# Patient Record
Sex: MALE | Race: BLACK OR AFRICAN AMERICAN | NOT HISPANIC OR LATINO | Employment: UNEMPLOYED | URBAN - METROPOLITAN AREA
[De-identification: names, ages, dates, MRNs, and addresses within clinical notes are randomized per-mention and may not be internally consistent; named-entity substitution may affect disease eponyms.]

---

## 2018-10-17 ENCOUNTER — OFFICE VISIT (OUTPATIENT)
Dept: FAMILY MEDICINE | Facility: CLINIC | Age: 58
End: 2018-10-17
Payer: MEDICARE

## 2018-10-17 VITALS
BODY MASS INDEX: 26.34 KG/M2 | OXYGEN SATURATION: 97 % | SYSTOLIC BLOOD PRESSURE: 132 MMHG | DIASTOLIC BLOOD PRESSURE: 84 MMHG | HEIGHT: 68 IN | TEMPERATURE: 98 F | HEART RATE: 74 BPM | WEIGHT: 173.81 LBS

## 2018-10-17 DIAGNOSIS — Z11.3 SCREENING EXAMINATION FOR STD (SEXUALLY TRANSMITTED DISEASE): ICD-10-CM

## 2018-10-17 DIAGNOSIS — N52.9 ERECTILE DYSFUNCTION, UNSPECIFIED ERECTILE DYSFUNCTION TYPE: ICD-10-CM

## 2018-10-17 DIAGNOSIS — Z12.11 ENCOUNTER FOR SCREENING FOR MALIGNANT NEOPLASM OF COLON: ICD-10-CM

## 2018-10-17 DIAGNOSIS — R79.9 ABNORMAL FINDING OF BLOOD CHEMISTRY: ICD-10-CM

## 2018-10-17 DIAGNOSIS — Z11.4 ENCOUNTER FOR SCREENING FOR HIV: ICD-10-CM

## 2018-10-17 DIAGNOSIS — R68.82 LOW LIBIDO: ICD-10-CM

## 2018-10-17 DIAGNOSIS — Z12.5 ENCOUNTER FOR SCREENING FOR MALIGNANT NEOPLASM OF PROSTATE: ICD-10-CM

## 2018-10-17 DIAGNOSIS — Z00.00 ROUTINE PHYSICAL EXAMINATION: Primary | ICD-10-CM

## 2018-10-17 DIAGNOSIS — R73.03 PREDIABETES: ICD-10-CM

## 2018-10-17 PROCEDURE — 99203 OFFICE O/P NEW LOW 30 MIN: CPT | Mod: PBBFAC,PO | Performed by: FAMILY MEDICINE

## 2018-10-17 PROCEDURE — 99999 PR PBB SHADOW E&M-NEW PATIENT-LVL III: CPT | Mod: PBBFAC,,, | Performed by: FAMILY MEDICINE

## 2018-10-17 PROCEDURE — 99214 OFFICE O/P EST MOD 30 MIN: CPT | Mod: S$PBB,,, | Performed by: FAMILY MEDICINE

## 2018-10-17 RX ORDER — HALOPERIDOL 5 MG/1
TABLET ORAL
COMMUNITY
Start: 2018-10-02 | End: 2018-10-17

## 2018-10-17 RX ORDER — SILDENAFIL CITRATE 20 MG/1
20 TABLET ORAL DAILY PRN
Qty: 30 TABLET | Refills: 3 | Status: ON HOLD | OUTPATIENT
Start: 2018-10-17 | End: 2020-10-04 | Stop reason: HOSPADM

## 2018-10-17 RX ORDER — PENICILLIN G BENZATHINE 2400000 [IU]/4ML
INJECTION, SUSPENSION INTRAMUSCULAR
COMMUNITY
Start: 2018-08-07 | End: 2018-10-17

## 2018-10-17 RX ORDER — VALACYCLOVIR HYDROCHLORIDE 500 MG/1
500 TABLET, FILM COATED ORAL 2 TIMES DAILY
Refills: 6 | COMMUNITY
Start: 2018-07-24 | End: 2018-10-17

## 2018-10-17 RX ORDER — HALOPERIDOL 10 MG/1
TABLET ORAL
COMMUNITY
Start: 2018-09-06 | End: 2018-10-17

## 2018-10-17 NOTE — PROGRESS NOTES
[facility]  Progress Note    SUBJECTIVE:     Chief Complaint   Patient presents with    Establish [facility]    Annual Exam       HPI   Sidney Taylor  is a 58 y.o. male here for routine physical exam and to establish care. He says having low libido and would like to have his testosterone checked. Doing well otherwise and has no new complaints/problems at this time.     Review of patient's allergies indicates:   Allergen Reactions    Bactrim [sulfamethoxazole-trimethoprim] Other (See Comments)     Patient states he gets really pale       History reviewed. No pertinent past medical history.  Past Surgical History:   Procedure Laterality Date    HAND SURGERY  2016     Family History   Problem Relation Age of Onset    Diabetes Mother     Hypertension Mother      Social History     Tobacco Use    Smoking status: Never Smoker    Smokeless tobacco: Never Used   Substance Use Topics    Alcohol use: No     Frequency: Never    Drug use: No        Review of Systems   Constitutional: Negative for chills, diaphoresis and fever.   HENT: Negative for congestion, ear pain and sore throat.    Eyes: Negative for photophobia and discharge.   Respiratory: Negative for cough, shortness of breath and wheezing.    Cardiovascular: Negative for chest pain and palpitations.   Gastrointestinal: Negative for abdominal pain, constipation, diarrhea, nausea and vomiting.   Genitourinary: Negative for dysuria and hematuria.   Musculoskeletal: Negative for back pain and myalgias.   Skin: Negative for itching and rash.   Neurological: Negative for dizziness, sensory change, focal weakness, weakness and headaches.   All other systems reviewed and are negative.    OBJECTIVE:     Vitals:    10/17/18 1020   BP: 132/84   Pulse: 74   Temp: 98.1 °F (36.7 °C)     Body mass index is 26.43 kg/m².    Physical Exam   Constitutional: He is oriented to person, place, and time and well-developed, well-nourished, and in no distress. No distress.    HENT:   Head: Normocephalic and atraumatic.   Right Ear: Tympanic membrane is not perforated, not erythematous and not bulging. No hemotympanum.   Left Ear: Tympanic membrane is not perforated, not erythematous and not bulging. No hemotympanum.   Mouth/Throat: Oropharynx is clear and moist. No oropharyngeal exudate.   Eyes: Conjunctivae and EOM are normal. Pupils are equal, round, and reactive to light.   Neck: No thyromegaly present.   Cardiovascular: Normal rate, regular rhythm and normal heart sounds. Exam reveals no gallop and no friction rub.   No murmur heard.  Pulmonary/Chest: Effort normal and breath sounds normal. No respiratory distress. He has no wheezes. He has no rales.   Abdominal: Soft. Bowel sounds are normal. He exhibits no distension. There is no tenderness. There is no rebound and no guarding.   Musculoskeletal: Normal range of motion. He exhibits no edema or tenderness.   Lymphadenopathy:     He has no cervical adenopathy.   Neurological: He is alert and oriented to person, place, and time.   Skin: Skin is warm. No rash noted. He is not diaphoretic. No erythema.       Old records were reviewed. Labs and/or images were independently reviewed.    ASSESSMENT     1. Routine physical examination    2. Encounter for screening for malignant neoplasm of colon    3. Low libido    4. Erectile dysfunction, unspecified erectile dysfunction type    5. Screening examination for STD (sexually transmitted disease)    6. Abnormal finding of blood chemistry     7. Prediabetes     8. Encounter for screening for malignant neoplasm of prostate     9. Encounter for screening for HIV         PLAN:     Routine physical examination  -     CBC auto differential; Future  -     Comprehensive metabolic panel; Future  -     Hemoglobin A1c; Future  -     Lipid panel; Future  -     T4, free; Future  -     TSH; Future  -     PSA, Screening; Future; Expected date: 10/17/2018  -     Hepatitis panel, acute; Future; Expected date:  10/17/2018  -     RPR; Future; Expected date: 10/17/2018  -     HIV-1 and HIV-2 antibodies; Future; Expected date: 10/17/2018  -     We briefly discussed diet, exercise, and routine preventive exams. All questions and comments addressed.    Encounter for screening for malignant neoplasm of colon  -     Case request GI: COLONOSCOPY    Low libido  -     Testosterone; Future; Expected date: 10/17/2018    Erectile dysfunction, unspecified erectile dysfunction type  -     sildenafil (REVATIO) 20 mg Tab; Take 1 tablet (20 mg total) by mouth daily as needed.  Dispense: 30 tablet; Refill: 3  -     Gave coupon to patient.     Screening examination for STD (sexually transmitted disease)  -     Hepatitis panel, acute; Future; Expected date: 10/17/2018  -     RPR; Future; Expected date: 10/17/2018  -     HIV-1 and HIV-2 antibodies; Future; Expected date: 10/17/2018    Prediabetes   -     T4, free; Future  -     TSH; Future    Encounter for screening for malignant neoplasm of prostate   -     PSA, Screening; Future; Expected date: 10/17/2018    Encounter for screening for HIV   -     HIV-1 and HIV-2 antibodies; Future; Expected date: 10/17/2018      RTC LUPILLO Santa MD  10/17/2018 4:34 PM

## 2018-10-18 ENCOUNTER — LAB VISIT (OUTPATIENT)
Dept: LAB | Facility: HOSPITAL | Age: 58
End: 2018-10-18
Attending: FAMILY MEDICINE
Payer: MEDICARE

## 2018-10-18 DIAGNOSIS — Z12.5 ENCOUNTER FOR SCREENING FOR MALIGNANT NEOPLASM OF PROSTATE: ICD-10-CM

## 2018-10-18 DIAGNOSIS — Z11.4 ENCOUNTER FOR SCREENING FOR HIV: ICD-10-CM

## 2018-10-18 DIAGNOSIS — R68.82 LOW LIBIDO: ICD-10-CM

## 2018-10-18 DIAGNOSIS — R73.03 PREDIABETES: ICD-10-CM

## 2018-10-18 DIAGNOSIS — R79.9 ABNORMAL FINDING OF BLOOD CHEMISTRY: ICD-10-CM

## 2018-10-18 DIAGNOSIS — Z00.00 ROUTINE PHYSICAL EXAMINATION: ICD-10-CM

## 2018-10-18 DIAGNOSIS — Z11.3 SCREENING EXAMINATION FOR STD (SEXUALLY TRANSMITTED DISEASE): ICD-10-CM

## 2018-10-18 LAB
ALBUMIN SERPL BCP-MCNC: 4 G/DL
ALP SERPL-CCNC: 75 U/L
ALT SERPL W/O P-5'-P-CCNC: 21 U/L
ANION GAP SERPL CALC-SCNC: 5 MMOL/L
AST SERPL-CCNC: 29 U/L
BASOPHILS # BLD AUTO: 0.03 K/UL
BASOPHILS NFR BLD: 0.7 %
BILIRUB SERPL-MCNC: 0.5 MG/DL
BUN SERPL-MCNC: 12 MG/DL
CALCIUM SERPL-MCNC: 9.7 MG/DL
CHLORIDE SERPL-SCNC: 102 MMOL/L
CHOLEST SERPL-MCNC: 155 MG/DL
CHOLEST/HDLC SERPL: 4.1 {RATIO}
CO2 SERPL-SCNC: 30 MMOL/L
COMPLEXED PSA SERPL-MCNC: 2.2 NG/ML
CREAT SERPL-MCNC: 1.2 MG/DL
DIFFERENTIAL METHOD: ABNORMAL
EOSINOPHIL # BLD AUTO: 0 K/UL
EOSINOPHIL NFR BLD: 0.7 %
ERYTHROCYTE [DISTWIDTH] IN BLOOD BY AUTOMATED COUNT: 12.5 %
EST. GFR  (AFRICAN AMERICAN): >60 ML/MIN/1.73 M^2
EST. GFR  (NON AFRICAN AMERICAN): >60 ML/MIN/1.73 M^2
ESTIMATED AVG GLUCOSE: 177 MG/DL
GLUCOSE SERPL-MCNC: 182 MG/DL
HBA1C MFR BLD HPLC: 7.8 %
HCT VFR BLD AUTO: 45.9 %
HDLC SERPL-MCNC: 38 MG/DL
HDLC SERPL: 24.5 %
HGB BLD-MCNC: 14.5 G/DL
IMM GRANULOCYTES # BLD AUTO: 0.01 K/UL
IMM GRANULOCYTES NFR BLD AUTO: 0.2 %
LDLC SERPL CALC-MCNC: 86.6 MG/DL
LYMPHOCYTES # BLD AUTO: 1.5 K/UL
LYMPHOCYTES NFR BLD: 36.1 %
MCH RBC QN AUTO: 29.7 PG
MCHC RBC AUTO-ENTMCNC: 31.6 G/DL
MCV RBC AUTO: 94 FL
MONOCYTES # BLD AUTO: 0.5 K/UL
MONOCYTES NFR BLD: 11.8 %
NEUTROPHILS # BLD AUTO: 2.1 K/UL
NEUTROPHILS NFR BLD: 50.5 %
NONHDLC SERPL-MCNC: 117 MG/DL
NRBC BLD-RTO: 0 /100 WBC
PLATELET # BLD AUTO: 179 K/UL
PMV BLD AUTO: 12.8 FL
POTASSIUM SERPL-SCNC: 3.9 MMOL/L
PROT SERPL-MCNC: 8 G/DL
RBC # BLD AUTO: 4.88 M/UL
SODIUM SERPL-SCNC: 137 MMOL/L
T4 FREE SERPL-MCNC: 0.91 NG/DL
TESTOST SERPL-MCNC: 452 NG/DL
TRIGL SERPL-MCNC: 152 MG/DL
TSH SERPL DL<=0.005 MIU/L-ACNC: 0.6 UIU/ML
WBC # BLD AUTO: 4.24 K/UL

## 2018-10-18 PROCEDURE — 83036 HEMOGLOBIN GLYCOSYLATED A1C: CPT

## 2018-10-18 PROCEDURE — 86592 SYPHILIS TEST NON-TREP QUAL: CPT

## 2018-10-18 PROCEDURE — 36415 COLL VENOUS BLD VENIPUNCTURE: CPT | Mod: PO

## 2018-10-18 PROCEDURE — 80061 LIPID PANEL: CPT

## 2018-10-18 PROCEDURE — 84153 ASSAY OF PSA TOTAL: CPT

## 2018-10-18 PROCEDURE — 80074 ACUTE HEPATITIS PANEL: CPT

## 2018-10-18 PROCEDURE — 84443 ASSAY THYROID STIM HORMONE: CPT

## 2018-10-18 PROCEDURE — 85025 COMPLETE CBC W/AUTO DIFF WBC: CPT

## 2018-10-18 PROCEDURE — 84439 ASSAY OF FREE THYROXINE: CPT

## 2018-10-18 PROCEDURE — 80053 COMPREHEN METABOLIC PANEL: CPT

## 2018-10-18 PROCEDURE — 84403 ASSAY OF TOTAL TESTOSTERONE: CPT

## 2018-10-18 PROCEDURE — 86703 HIV-1/HIV-2 1 RESULT ANTBDY: CPT

## 2018-10-19 DIAGNOSIS — Z11.59 NEED FOR HEPATITIS C SCREENING TEST: ICD-10-CM

## 2018-10-19 DIAGNOSIS — E11.9 TYPE 2 DIABETES MELLITUS WITHOUT COMPLICATION: ICD-10-CM

## 2018-10-19 LAB
HAV IGM SERPL QL IA: NEGATIVE
HBV CORE IGM SERPL QL IA: NEGATIVE
HBV SURFACE AG SERPL QL IA: NEGATIVE
HCV AB SERPL QL IA: NEGATIVE
HIV 1+2 AB+HIV1 P24 AG SERPL QL IA: NEGATIVE
RPR SER QL: NORMAL

## 2018-11-09 DIAGNOSIS — E11.9 TYPE 2 DIABETES MELLITUS WITHOUT COMPLICATION, UNSPECIFIED WHETHER LONG TERM INSULIN USE: ICD-10-CM

## 2018-11-27 ENCOUNTER — OFFICE VISIT (OUTPATIENT)
Dept: FAMILY MEDICINE | Facility: CLINIC | Age: 58
End: 2018-11-27
Payer: MEDICARE

## 2018-11-27 ENCOUNTER — LAB VISIT (OUTPATIENT)
Dept: LAB | Facility: HOSPITAL | Age: 58
End: 2018-11-27
Attending: FAMILY MEDICINE
Payer: MEDICARE

## 2018-11-27 ENCOUNTER — CLINICAL SUPPORT (OUTPATIENT)
Dept: OPHTHALMOLOGY | Facility: CLINIC | Age: 58
End: 2018-11-27
Attending: FAMILY MEDICINE
Payer: MEDICARE

## 2018-11-27 VITALS
SYSTOLIC BLOOD PRESSURE: 138 MMHG | HEIGHT: 68 IN | DIASTOLIC BLOOD PRESSURE: 88 MMHG | TEMPERATURE: 98 F | BODY MASS INDEX: 27.06 KG/M2 | OXYGEN SATURATION: 97 % | HEART RATE: 79 BPM | WEIGHT: 178.56 LBS

## 2018-11-27 DIAGNOSIS — I10 ESSENTIAL HYPERTENSION, BENIGN: ICD-10-CM

## 2018-11-27 DIAGNOSIS — E11.9 CONTROLLED TYPE 2 DIABETES MELLITUS WITHOUT COMPLICATION, WITHOUT LONG-TERM CURRENT USE OF INSULIN: Primary | ICD-10-CM

## 2018-11-27 DIAGNOSIS — Z12.11 ENCOUNTER FOR SCREENING FOR MALIGNANT NEOPLASM OF COLON: ICD-10-CM

## 2018-11-27 DIAGNOSIS — H40.013 OPEN ANGLE WITH BORDERLINE FINDINGS AND LOW GLAUCOMA RISK IN BOTH EYES: Primary | ICD-10-CM

## 2018-11-27 DIAGNOSIS — E11.9 CONTROLLED TYPE 2 DIABETES MELLITUS WITHOUT COMPLICATION, WITHOUT LONG-TERM CURRENT USE OF INSULIN: ICD-10-CM

## 2018-11-27 LAB
ALBUMIN/CREAT UR: 64.5 UG/MG
CREAT UR-MCNC: 31 MG/DL
MICROALBUMIN UR DL<=1MG/L-MCNC: 20 UG/ML

## 2018-11-27 PROCEDURE — 82043 UR ALBUMIN QUANTITATIVE: CPT

## 2018-11-27 PROCEDURE — 3045F PR MOST RECENT HEMOGLOBIN A1C LEVEL 7.0-9.0%: CPT | Mod: CPTII,S$GLB,, | Performed by: FAMILY MEDICINE

## 2018-11-27 PROCEDURE — 99999 PR PBB SHADOW E&M-EST. PATIENT-LVL II: CPT | Mod: PBBFAC,,,

## 2018-11-27 PROCEDURE — 99499 UNLISTED E&M SERVICE: CPT | Mod: S$GLB,,, | Performed by: FAMILY MEDICINE

## 2018-11-27 PROCEDURE — 99999 PR PBB SHADOW E&M-EST. PATIENT-LVL III: CPT | Mod: PBBFAC,,, | Performed by: FAMILY MEDICINE

## 2018-11-27 PROCEDURE — 3008F BODY MASS INDEX DOCD: CPT | Mod: CPTII,S$GLB,, | Performed by: FAMILY MEDICINE

## 2018-11-27 PROCEDURE — 92250 FUNDUS PHOTOGRAPHY W/I&R: CPT | Mod: S$GLB,,, | Performed by: OPHTHALMOLOGY

## 2018-11-27 PROCEDURE — 99214 OFFICE O/P EST MOD 30 MIN: CPT | Mod: S$GLB,,, | Performed by: FAMILY MEDICINE

## 2018-11-27 RX ORDER — LANCETS
1 EACH MISCELLANEOUS 3 TIMES DAILY
Qty: 100 EACH | Refills: 5 | Status: ON HOLD | OUTPATIENT
Start: 2018-11-27 | End: 2020-10-04 | Stop reason: HOSPADM

## 2018-11-27 RX ORDER — INSULIN PUMP SYRINGE, 3 ML
EACH MISCELLANEOUS
Qty: 1 EACH | Refills: 0 | Status: ON HOLD | OUTPATIENT
Start: 2018-11-27 | End: 2020-10-04 | Stop reason: HOSPADM

## 2018-11-27 RX ORDER — LOSARTAN POTASSIUM 25 MG/1
25 TABLET ORAL DAILY
Qty: 90 TABLET | Refills: 3 | Status: ON HOLD | OUTPATIENT
Start: 2018-11-27 | End: 2020-10-04 | Stop reason: HOSPADM

## 2018-11-27 RX ORDER — GLIPIZIDE 2.5 MG/1
2.5 TABLET, EXTENDED RELEASE ORAL
Qty: 90 TABLET | Refills: 3 | Status: ON HOLD | OUTPATIENT
Start: 2018-11-27 | End: 2020-10-04 | Stop reason: HOSPADM

## 2018-11-27 NOTE — PROGRESS NOTES
[facility]  Progress Note    SUBJECTIVE:     Chief Complaint   Patient presents with    Diabetes    Hypertension       HPI   Sidney Taylor  is a 58 y.o. male here for follow up of his chronic conditions. Admits doesn't diet and exericse. He will cut out sugary drinks. Patient has no other new complaints/problems at this time.      Review of patient's allergies indicates:   Allergen Reactions    Bactrim [sulfamethoxazole-trimethoprim] Other (See Comments)     Patient states he gets really pale       History reviewed. No pertinent past medical history.  Past Surgical History:   Procedure Laterality Date    HAND SURGERY  2016     Family History   Problem Relation Age of Onset    Diabetes Mother     Hypertension Mother      Social History     Tobacco Use    Smoking status: Never Smoker    Smokeless tobacco: Never Used   Substance Use Topics    Alcohol use: No     Frequency: Never    Drug use: No        Review of Systems   Constitutional: Negative for chills, fever and malaise/fatigue.   HENT: Negative.    Respiratory: Negative.  Negative for cough and shortness of breath.    Cardiovascular: Negative.  Negative for chest pain.   Gastrointestinal: Negative.  Negative for abdominal pain, nausea and vomiting.   Genitourinary: Negative.    Neurological: Negative for weakness and headaches.   All other systems reviewed and are negative.    OBJECTIVE:     Vitals:    11/27/18 1341   BP: (!) 150/92   Pulse: 79   Temp: 97.9 °F (36.6 °C)     Body mass index is 27.15 kg/m².    Physical Exam   Constitutional: He is oriented to person, place, and time and well-developed, well-nourished, and in no distress. No distress.   HENT:   Head: Normocephalic and atraumatic.   Nose: Nose normal.   Eyes: Conjunctivae and EOM are normal.   Cardiovascular: Normal rate, regular rhythm and normal heart sounds. Exam reveals no gallop and no friction rub.   No murmur heard.  Pulmonary/Chest: Effort normal and breath sounds normal.  No respiratory distress. He has no wheezes. He has no rales. He exhibits no tenderness.   Abdominal: Soft. Bowel sounds are normal. He exhibits no distension. There is no tenderness. There is no rebound.   Neurological: He is alert and oriented to person, place, and time.   Skin: Skin is warm. He is not diaphoretic.     Protective Sensation (w/ 10 gram monofilament):  Right: Intact  Left: Intact    Visual Inspection:  Normal -  Bilateral    Pedal Pulses:   Right: Present  Left: Present    Posterior tibialis:   Right:Present  Left: Present      Old records were reviewed. Labs and/or images were independently reviewed.    ASSESSMENT     1. Controlled type 2 diabetes mellitus without complication, without long-term current use of insulin    2. Essential hypertension, benign    3. Encounter for screening for malignant neoplasm of colon        PLAN:     Controlled type 2 diabetes mellitus without complication, without long-term current use of insulin  -     Start glipiZIDE (GLUCOTROL) 2.5 MG TR24; Take 1 tablet (2.5 mg total) by mouth daily with breakfast.  Dispense: 90 tablet; Refill: 3  -     Start losartan (COZAAR) 25 MG tablet; Take 1 tablet (25 mg total) by mouth once daily.  Dispense: 90 tablet; Refill: 3  -     Diabetic Eye Screening Photo; Future  -     Microalbumin/creatinine urine ratio; Future; Expected date: 11/27/2018  -     Instructed patient to take daily glucose AM logs and to write them down to bring with on next visit. Advised patient to decrease intake of carbohydrates/simple sugars.         Essential hypertension, benign  -     Start losartan (COZAAR) 25 MG tablet; Take 1 tablet (25 mg total) by mouth once daily.  Dispense: 90 tablet; Refill: 3  -     Educated patient to take medications as prescribed, and to record bp logs daily to bring along to next visit. Instructed patient to decrease salt intake. Also told patient to call if any new signs or symptoms develop.    Encounter for screening for  malignant neoplasm of colon  -     Case request GI: COLONOSCOPY      RTC PRN, 3 months.    Samuel Santa MD  11/27/2018 2:03 PM

## 2018-11-27 NOTE — PROGRESS NOTES
HPI     57 Y/o here for screening for diabetic retinopathy with non-dilated   fundus photos per Dr. Santa     Last edited by Rosangela Lemon MA on 11/27/2018  2:47 PM. (History)            Assessment /Plan     For exam results, see Encounter Report.    Controlled type 2 diabetes mellitus without complication, without long-term current use of insulin  -     Diabetic Eye Screening Photo      Please see Dr. Finney progress note for interpretation

## 2018-11-28 ENCOUNTER — TELEPHONE (OUTPATIENT)
Dept: OPHTHALMOLOGY | Facility: CLINIC | Age: 58
End: 2018-11-28

## 2018-11-28 PROBLEM — R80.9 CONTROLLED TYPE 2 DIABETES MELLITUS WITH MICROALBUMINURIA, WITHOUT LONG-TERM CURRENT USE OF INSULIN: Status: ACTIVE | Noted: 2018-11-27

## 2018-11-28 PROBLEM — E11.29 CONTROLLED TYPE 2 DIABETES MELLITUS WITH MICROALBUMINURIA, WITHOUT LONG-TERM CURRENT USE OF INSULIN: Status: ACTIVE | Noted: 2018-11-27

## 2018-11-28 NOTE — TELEPHONE ENCOUNTER
Called patient left voicemail regarding diabetic eye screening results; Follow up in 2-4 months. Recommend follow-up with primary eye care.      ----- Message from Maryellen Drummond sent at 11/28/2018  8:08 AM CST -----      ----- Message -----  From: Brian Finney MD  Sent: 11/27/2018   5:16 PM  To: To Damian

## 2018-12-03 ENCOUNTER — TELEPHONE (OUTPATIENT)
Dept: OPHTHALMOLOGY | Facility: CLINIC | Age: 58
End: 2018-12-03

## 2018-12-03 NOTE — TELEPHONE ENCOUNTER
Called patient regarding diabetic eye screening result the number on file someone answer and stated wrong number ; will send patient a letter.      ----- Message from Maryellen Drummond sent at 11/28/2018  8:08 AM CST -----      ----- Message -----  From: Brian Finney MD  Sent: 11/27/2018   5:16 PM  To: To Damian

## 2018-12-10 ENCOUNTER — TELEPHONE (OUTPATIENT)
Dept: FAMILY MEDICINE | Facility: CLINIC | Age: 58
End: 2018-12-10

## 2018-12-10 NOTE — TELEPHONE ENCOUNTER
----- Message from Angelita Emmanuel MA sent at 12/10/2018  1:40 PM CST -----  Contact: Cee from [facility] [phone]      ----- Message -----  From: Trevin Story  Sent: 12/10/2018   1:12 PM  To: Josh Ingrma Staff    Cee from [facility] called to get a medical necessity form for the patient's diabetic supplies. They also would like clinical notes as well. They can be faxed over to: [phone].

## 2019-03-28 ENCOUNTER — LAB VISIT (OUTPATIENT)
Dept: LAB | Facility: HOSPITAL | Age: 59
End: 2019-03-28
Attending: FAMILY MEDICINE
Payer: MEDICARE

## 2019-03-28 ENCOUNTER — OFFICE VISIT (OUTPATIENT)
Dept: FAMILY MEDICINE | Facility: CLINIC | Age: 59
End: 2019-03-28
Payer: MEDICARE

## 2019-03-28 VITALS
TEMPERATURE: 98 F | HEART RATE: 76 BPM | WEIGHT: 165.13 LBS | OXYGEN SATURATION: 98 % | HEIGHT: 69 IN | SYSTOLIC BLOOD PRESSURE: 118 MMHG | DIASTOLIC BLOOD PRESSURE: 70 MMHG | BODY MASS INDEX: 24.46 KG/M2

## 2019-03-28 DIAGNOSIS — E11.29 CONTROLLED TYPE 2 DIABETES MELLITUS WITH MICROALBUMINURIA, WITHOUT LONG-TERM CURRENT USE OF INSULIN: Primary | ICD-10-CM

## 2019-03-28 DIAGNOSIS — E11.29 CONTROLLED TYPE 2 DIABETES MELLITUS WITH MICROALBUMINURIA, WITHOUT LONG-TERM CURRENT USE OF INSULIN: ICD-10-CM

## 2019-03-28 DIAGNOSIS — R80.9 CONTROLLED TYPE 2 DIABETES MELLITUS WITH MICROALBUMINURIA, WITHOUT LONG-TERM CURRENT USE OF INSULIN: Primary | ICD-10-CM

## 2019-03-28 DIAGNOSIS — R63.4 UNINTENTIONAL WEIGHT LOSS: ICD-10-CM

## 2019-03-28 DIAGNOSIS — Z11.59 NEED FOR HEPATITIS C SCREENING TEST: ICD-10-CM

## 2019-03-28 DIAGNOSIS — R80.9 CONTROLLED TYPE 2 DIABETES MELLITUS WITH MICROALBUMINURIA, WITHOUT LONG-TERM CURRENT USE OF INSULIN: ICD-10-CM

## 2019-03-28 LAB
ALBUMIN SERPL BCP-MCNC: 4.2 G/DL (ref 3.5–5.2)
ALP SERPL-CCNC: 73 U/L (ref 55–135)
ALT SERPL W/O P-5'-P-CCNC: 21 U/L (ref 10–44)
ANION GAP SERPL CALC-SCNC: 6 MMOL/L (ref 8–16)
AST SERPL-CCNC: 17 U/L (ref 10–40)
BASOPHILS # BLD AUTO: 0.03 K/UL (ref 0–0.2)
BASOPHILS NFR BLD: 0.7 % (ref 0–1.9)
BILIRUB SERPL-MCNC: 0.4 MG/DL (ref 0.1–1)
BUN SERPL-MCNC: 14 MG/DL (ref 6–20)
CALCIUM SERPL-MCNC: 9.8 MG/DL (ref 8.7–10.5)
CHLORIDE SERPL-SCNC: 101 MMOL/L (ref 95–110)
CO2 SERPL-SCNC: 31 MMOL/L (ref 23–29)
CREAT SERPL-MCNC: 1.1 MG/DL (ref 0.5–1.4)
DIFFERENTIAL METHOD: ABNORMAL
EOSINOPHIL # BLD AUTO: 0 K/UL (ref 0–0.5)
EOSINOPHIL NFR BLD: 0.5 % (ref 0–8)
ERYTHROCYTE [DISTWIDTH] IN BLOOD BY AUTOMATED COUNT: 12.5 % (ref 11.5–14.5)
EST. GFR  (AFRICAN AMERICAN): >60 ML/MIN/1.73 M^2
EST. GFR  (NON AFRICAN AMERICAN): >60 ML/MIN/1.73 M^2
ESTIMATED AVG GLUCOSE: 128 MG/DL (ref 68–131)
GLUCOSE SERPL-MCNC: 176 MG/DL (ref 70–110)
HBA1C MFR BLD HPLC: 6.1 % (ref 4–5.6)
HCT VFR BLD AUTO: 43.6 % (ref 40–54)
HGB BLD-MCNC: 13.7 G/DL (ref 14–18)
IMM GRANULOCYTES # BLD AUTO: 0.01 K/UL (ref 0–0.04)
IMM GRANULOCYTES NFR BLD AUTO: 0.2 % (ref 0–0.5)
LYMPHOCYTES # BLD AUTO: 1.6 K/UL (ref 1–4.8)
LYMPHOCYTES NFR BLD: 37.1 % (ref 18–48)
MCH RBC QN AUTO: 29.7 PG (ref 27–31)
MCHC RBC AUTO-ENTMCNC: 31.4 G/DL (ref 32–36)
MCV RBC AUTO: 94 FL (ref 82–98)
MONOCYTES # BLD AUTO: 0.4 K/UL (ref 0.3–1)
MONOCYTES NFR BLD: 9.6 % (ref 4–15)
NEUTROPHILS # BLD AUTO: 2.2 K/UL (ref 1.8–7.7)
NEUTROPHILS NFR BLD: 51.9 % (ref 38–73)
NRBC BLD-RTO: 0 /100 WBC
PLATELET # BLD AUTO: 160 K/UL (ref 150–350)
PMV BLD AUTO: 12.7 FL (ref 9.2–12.9)
POTASSIUM SERPL-SCNC: 4.2 MMOL/L (ref 3.5–5.1)
PROT SERPL-MCNC: 7.5 G/DL (ref 6–8.4)
RBC # BLD AUTO: 4.62 M/UL (ref 4.6–6.2)
SODIUM SERPL-SCNC: 138 MMOL/L (ref 136–145)
WBC # BLD AUTO: 4.28 K/UL (ref 3.9–12.7)

## 2019-03-28 PROCEDURE — 80053 COMPREHEN METABOLIC PANEL: CPT

## 2019-03-28 PROCEDURE — 99499 RISK ADDL DX/OHS AUDIT: ICD-10-PCS | Mod: S$GLB,,, | Performed by: NURSE PRACTITIONER

## 2019-03-28 PROCEDURE — 99499 UNLISTED E&M SERVICE: CPT | Mod: S$GLB,,, | Performed by: NURSE PRACTITIONER

## 2019-03-28 PROCEDURE — 99214 PR OFFICE/OUTPT VISIT, EST, LEVL IV, 30-39 MIN: ICD-10-PCS | Mod: S$GLB,,, | Performed by: NURSE PRACTITIONER

## 2019-03-28 PROCEDURE — 3074F SYST BP LT 130 MM HG: CPT | Mod: CPTII,S$GLB,, | Performed by: NURSE PRACTITIONER

## 2019-03-28 PROCEDURE — 86803 HEPATITIS C AB TEST: CPT

## 2019-03-28 PROCEDURE — 99999 PR PBB SHADOW E&M-EST. PATIENT-LVL IV: CPT | Mod: PBBFAC,,, | Performed by: NURSE PRACTITIONER

## 2019-03-28 PROCEDURE — 99999 PR PBB SHADOW E&M-EST. PATIENT-LVL IV: ICD-10-PCS | Mod: PBBFAC,,, | Performed by: NURSE PRACTITIONER

## 2019-03-28 PROCEDURE — 2024F 7 FLD RTA PHOTO EVC RTNOPTHY: CPT | Mod: S$GLB,,, | Performed by: NURSE PRACTITIONER

## 2019-03-28 PROCEDURE — 3078F DIAST BP <80 MM HG: CPT | Mod: CPTII,S$GLB,, | Performed by: NURSE PRACTITIONER

## 2019-03-28 PROCEDURE — 3045F PR MOST RECENT HEMOGLOBIN A1C LEVEL 7.0-9.0%: ICD-10-PCS | Mod: CPTII,S$GLB,, | Performed by: NURSE PRACTITIONER

## 2019-03-28 PROCEDURE — 3078F PR MOST RECENT DIASTOLIC BLOOD PRESSURE < 80 MM HG: ICD-10-PCS | Mod: CPTII,S$GLB,, | Performed by: NURSE PRACTITIONER

## 2019-03-28 PROCEDURE — 3074F PR MOST RECENT SYSTOLIC BLOOD PRESSURE < 130 MM HG: ICD-10-PCS | Mod: CPTII,S$GLB,, | Performed by: NURSE PRACTITIONER

## 2019-03-28 PROCEDURE — 83036 HEMOGLOBIN GLYCOSYLATED A1C: CPT

## 2019-03-28 PROCEDURE — 3008F PR BODY MASS INDEX (BMI) DOCUMENTED: ICD-10-PCS | Mod: CPTII,S$GLB,, | Performed by: NURSE PRACTITIONER

## 2019-03-28 PROCEDURE — 99214 OFFICE O/P EST MOD 30 MIN: CPT | Mod: S$GLB,,, | Performed by: NURSE PRACTITIONER

## 2019-03-28 PROCEDURE — 2024F PR 7 FIELD PHOTOS WITH INTERP/ REVIEW: ICD-10-PCS | Mod: S$GLB,,, | Performed by: NURSE PRACTITIONER

## 2019-03-28 PROCEDURE — 3008F BODY MASS INDEX DOCD: CPT | Mod: CPTII,S$GLB,, | Performed by: NURSE PRACTITIONER

## 2019-03-28 PROCEDURE — 85025 COMPLETE CBC W/AUTO DIFF WBC: CPT

## 2019-03-28 PROCEDURE — 36415 COLL VENOUS BLD VENIPUNCTURE: CPT | Mod: PO

## 2019-03-28 PROCEDURE — 3045F PR MOST RECENT HEMOGLOBIN A1C LEVEL 7.0-9.0%: CPT | Mod: CPTII,S$GLB,, | Performed by: NURSE PRACTITIONER

## 2019-03-28 NOTE — PROGRESS NOTES
Subjective:       Patient ID: Sidney Taylor is a 58 y.o. male.    Chief Complaint: Medication Problem (medicine causing weight loss.)    Patient is a  58  year old  Male new to me but known to the clinic who presented to clinic with complaints of weight loss. Patient states he was diagnosed by his PCP NOV. 2018 and  Was prescribed Glipizide for the diabetes. Patient states he has loss 13 lbs since he started the medication in Nov. Patient feels he is losing to much weight. He also compliant of dry lips.     Past Medical History:   Diagnosis Date    Hypertension     Type 2 diabetes mellitus 11/2018     Past Surgical History:   Procedure Laterality Date    HAND SURGERY  2016     Social History     Socioeconomic History    Marital status: Single     Spouse name: None    Number of children: None    Years of education: None    Highest education level: None   Occupational History    None   Social Needs    Financial resource strain: None    Food insecurity:     Worry: None     Inability: None    Transportation needs:     Medical: None     Non-medical: None   Tobacco Use    Smoking status: Never Smoker    Smokeless tobacco: Never Used   Substance and Sexual Activity    Alcohol use: No     Frequency: Never    Drug use: No    Sexual activity: Yes     Partners: Female     Birth control/protection: Condom   Lifestyle    Physical activity:     Days per week: None     Minutes per session: None    Stress: None   Relationships    Social connections:     Talks on phone: None     Gets together: None     Attends Yarsani service: None     Active member of club or organization: None     Attends meetings of clubs or organizations: None     Relationship status: None    Intimate partner violence:     Fear of current or ex partner: None     Emotionally abused: None     Physically abused: None     Forced sexual activity: None   Other Topics Concern    None   Social History Narrative    None     Family History   Problem  Relation Age of Onset    Diabetes Mother     Hypertension Mother      Review of Systems   Constitutional: Positive for unexpected weight change. Negative for chills, diaphoresis, fatigue and fever.   HENT: Negative for congestion, postnasal drip, rhinorrhea, sinus pressure and sneezing.    Respiratory: Negative for cough, chest tightness and shortness of breath.    Cardiovascular: Negative for chest pain, palpitations and leg swelling.   Gastrointestinal: Negative for abdominal pain, diarrhea, nausea and vomiting.   Genitourinary: Negative for decreased urine volume and difficulty urinating.   Musculoskeletal: Negative for arthralgias, back pain and myalgias.   Skin: Negative for color change and rash.   Neurological: Negative for dizziness, weakness, light-headedness and headaches.       Objective:      Physical Exam   Constitutional: He is oriented to person, place, and time. Vital signs are normal. He appears well-developed and well-nourished.   HENT:   Head: Normocephalic and atraumatic.   Right Ear: External ear normal.   Left Ear: External ear normal.   Nose: Nose normal.   Mouth/Throat: Oropharynx is clear and moist. No oropharyngeal exudate.   Cardiovascular: Normal rate, regular rhythm and normal heart sounds.   Pulmonary/Chest: Effort normal and breath sounds normal.   Abdominal: Soft. Bowel sounds are normal.   Neurological: He is alert and oriented to person, place, and time.   Skin: Skin is warm, dry and intact. Capillary refill takes less than 2 seconds.   Psychiatric: He has a normal mood and affect.       Assessment:       1. Controlled type 2 diabetes mellitus with microalbuminuria, without long-term current use of insulin    2. Encounter for screening mammogram for malignant neoplasm of breast    3. Unintentional weight loss        Plan:          Sidney was seen today for medication problem.    Diagnoses and all orders for this visit:    Controlled type 2 diabetes mellitus with microalbuminuria,  without long-term current use of insulin  -     CBC auto differential; Future  -     Hemoglobin A1c; Future  -     Comprehensive metabolic panel; Future    Encounter for screening mammogram for malignant neoplasm of breast  -     Fecal Immunochemical Test (iFOBT); Future    Unintentional weight loss  -     CBC auto differential; Future  -     Hemoglobin A1c; Future  -     Comprehensive metabolic panel; Future      Home care  Follow these guidelines when caring for yourself at home:  · Follow the diet your healthcare provider gives you. Take insulin or other diabetes medicine exactly as told to.  · Watch your blood sugar as you are told to. Keep a log of your results. This will help your provider change your medicines to keep your blood sugar under control.  · Try to reach your ideal weight. You may be able to cut back on or not have to take diabetes medicine if you eat the right foods and get exercise.  · Do not smoke. Smoking worsens the effects of diabetes on your circulation. You are much more likely to have a heart attack if you have diabetes and you smoke.  · Take good care of your feet. If you have lost feeling in your feet, you may not see an injury or infection. Check your feet and between your toes at least once a week.  · Wear a medical alert bracelet or necklace, or carry a card in your wallet that says you have diabetes. This will help healthcare providers give you the right care if you get very ill and cannot tell them that you have diabetes.  Sick day plan  If you get a cold, the flu, or a bacterial or viral infection, take these steps:  · Look at your diabetes sick plan and call your healthcare provider as you were told to. You may need to call your provider right away if:  ¨ Your blood sugar is above 240 while taking your diabetes medicine  ¨ Your urine ketone levels are above normal or high  ¨ You have been vomiting more than 6 hours  ¨ You have trouble breathing or your breath ha s a fruity  smell  ¨ You have a high fever  ¨ You have a fever for several days and you are not getting better  ¨ You get light-headed and are sleepier than usual  · Keep taking your diabetes pills (oral medicine) even if you have been vomiting and are feeling sick. Call your provider right away because you may need insulin to lower your blood sugar until you recover from your illness.  · Keep taking your insulin even if you have been vomiting and are feeling sick. Call your provider right away to ask if you need to change your insulin dose. This will depend on your blood sugar results.  · Check your blood sugar every 2 to 4 hours, or at least 4 times a day.  · Check your ketones often. If you are vomiting and having diarrhea, watch them more often.  · Do not skip meals. Try to eat small meals on a regular schedule. Do this even if you do not feel like eating.  · Drink water or other liquids that do not have caffeine or calories. This will keep you from getting dehydrated. If you are nauseated or vomiting, takes small sips every 5 minutes. To prevent dehydration try to drink a cup (8 ounces) of fluids every hour while you are awake.  General care  Always bring a source of fast-acting sugar with you in case you have symptoms of low blood sugar (below 70). At the first sign of low blood sugar, eat or drink 15 to 20 grams of fast-acting sugar to raise your blood sugar. Examples are:  · 3 to 4 glucose tablets. You can buy these at most drugstores.  · 4 ounces (1/2 cup) of regular (not diet) soft drinks  · 4 ounces (1/2 cup) of any fruit juice  · 8 ounces (1 cup) of milk  · 5 to 6 pieces of hard candy  · 1 tablespoon of honey  Check your blood sugar 15 minutes after treating yourself. If it is still below 70, take 15 to 20 more grams of fast-acting sugar. Test again in 15 minutes. If it returns to normal (70 or above), eat a snack or meal to keep your blood sugar in a safe range. If it stays low, call your doctor or go to an  emergency room.  Follow-up care  Follow-up with your healthcare provider, or as advised. For more information about diabetes, visit the American Diabetes Association website at www.diabetes.org or call 918-353-2237.  When to seek medical advice  Call your healthcare provider right away if you have any of these symptoms of high blood sugar:  · Frequent urination  · Dizziness  · Drowsiness  · Thirst  · Headache  · Nausea or vomiting  · Abdominal pain  · Eyesight changes  · Fast breathing  · Confusion or loss of consciousness  Also call your provider right away if you have any of these signs of low blood sugar:  · Fatigue  · Headache  · Shakes  · Excess sweating  · Hunger  · Feeling anxious or restless  · Eyesight changes  · Drowsiness  · Weakness  · Confusion or loss of consciousness  Call 911  Call for emergency help right away if any of these occur:  · Chest pain or shortness of breath  · Dizziness or fainting  · Weakness of an arm or leg or one side of the face  · Trouble speaking or seeing   Date Last Reviewed: 6/1/2016  © 2443-9341 [employer]. [address] [place] [address], [place] [address]. All rights reserved. This information is not intended as a substitute for professional medical care. Always follow your healthcare professional's instructions.

## 2019-03-28 NOTE — PATIENT INSTRUCTIONS
Diabetes (General Information)  Diabetes is a long-term health problem. It means your body does not make enough insulin. Or it may mean that your body cannot use the insulin it makes. Insulin is a hormone in your body. It lets blood sugar (glucose) reach the cells in your body. All of your cells need glucose for fuel.  When you have diabetes, the glucose in your blood builds up because it cannot get into the cells. This buildup is called high blood sugar (hyperglycemia).  Your blood sugar level depends on several things. It depends on what kind of food you eat and how much of it you eat. It also depends on how much exercise you get, and how much insulin you have in your body. Eating too much of the wrong kinds of food or not taking diabetes medicine on time can cause high blood sugar. Infections can cause high blood sugar even if you are taking medicines correctly.  These things can also cause low blood sugar:  · Missing meals  · Not eating enough food  · Taking too much diabetes medicine  Diabetes can cause serious problems over time if you do not get treated. These problems include heart disease, stroke, kidney failure, and blindness. They also include nerve pain or loss of feeling in your legs and feet, and gangrene of the feet. By keeping your blood sugar under control you can prevent or delay these problems.  Normal blood sugar levels are 80 to 100 before a meal and less than 180 in the 1 to 2 hours after a meal.  Home care  Follow these guidelines when caring for yourself at home:  · Follow the diet your healthcare provider gives you. Take insulin or other diabetes medicine exactly as told to.  · Watch your blood sugar as you are told to. Keep a log of your results. This will help your provider change your medicines to keep your blood sugar under control.  · Try to reach your ideal weight. You may be able to cut back on or not have to take diabetes medicine if you eat the right foods and get exercise.  · Do  not smoke. Smoking worsens the effects of diabetes on your circulation. You are much more likely to have a heart attack if you have diabetes and you smoke.  · Take good care of your feet. If you have lost feeling in your feet, you may not see an injury or infection. Check your feet and between your toes at least once a week.  · Wear a medical alert bracelet or necklace, or carry a card in your wallet that says you have diabetes. This will help healthcare providers give you the right care if you get very ill and cannot tell them that you have diabetes.  Sick day plan  If you get a cold, the flu, or a bacterial or viral infection, take these steps:  · Look at your diabetes sick plan and call your healthcare provider as you were told to. You may need to call your provider right away if:  ¨ Your blood sugar is above 240 while taking your diabetes medicine  ¨ Your urine ketone levels are above normal or high  ¨ You have been vomiting more than 6 hours  ¨ You have trouble breathing or your breath ha s a fruity smell  ¨ You have a high fever  ¨ You have a fever for several days and you are not getting better  ¨ You get light-headed and are sleepier than usual  · Keep taking your diabetes pills (oral medicine) even if you have been vomiting and are feeling sick. Call your provider right away because you may need insulin to lower your blood sugar until you recover from your illness.  · Keep taking your insulin even if you have been vomiting and are feeling sick. Call your provider right away to ask if you need to change your insulin dose. This will depend on your blood sugar results.  · Check your blood sugar every 2 to 4 hours, or at least 4 times a day.  · Check your ketones often. If you are vomiting and having diarrhea, watch them more often.  · Do not skip meals. Try to eat small meals on a regular schedule. Do this even if you do not feel like eating.  · Drink water or other liquids that do not have caffeine or  calories. This will keep you from getting dehydrated. If you are nauseated or vomiting, takes small sips every 5 minutes. To prevent dehydration try to drink a cup (8 ounces) of fluids every hour while you are awake.  General care  Always bring a source of fast-acting sugar with you in case you have symptoms of low blood sugar (below 70). At the first sign of low blood sugar, eat or drink 15 to 20 grams of fast-acting sugar to raise your blood sugar. Examples are:  · 3 to 4 glucose tablets. You can buy these at most [employer].  · 4 ounces (1/2 cup) of regular (not diet) soft drinks  · 4 ounces (1/2 cup) of any fruit juice  · 8 ounces (1 cup) of milk  · 5 to 6 pieces of hard candy  · 1 tablespoon of honey  Check your blood sugar 15 minutes after treating yourself. If it is still below 70, take 15 to 20 more grams of fast-acting sugar. Test again in 15 minutes. If it returns to normal (70 or above), eat a snack or meal to keep your blood sugar in a safe range. If it stays low, call your doctor or go to an emergency room.  Follow-up care  Follow-up with your healthcare provider, or as advised. For more information about diabetes, visit the American Diabetes Association website at www.diabetes.org or call 180-602-1665.  When to seek medical advice  Call your healthcare provider right away if you have any of these symptoms of high blood sugar:  · Frequent urination  · Dizziness  · Drowsiness  · Thirst  · Headache  · Nausea or vomiting  · Abdominal pain  · Eyesight changes  · Fast breathing  · Confusion or loss of consciousness  Also call your provider right away if you have any of these signs of low blood sugar:  · Fatigue  · Headache  · Shakes  · Excess sweating  · Hunger  · Feeling anxious or restless  · Eyesight changes  · Drowsiness  · Weakness  · Confusion or loss of consciousness  Call 911  Call for emergency help right away if any of these occur:  · Chest pain or shortness of breath  · Dizziness or  fainting  · Weakness of an arm or leg or one side of the face  · Trouble speaking or seeing   Date Last Reviewed: 6/1/2016  © 0319-6905 [employer] [address] [place] [address], [place] [address]. All rights reserved. This information is not intended as a substitute for professional medical care. Always follow your healthcare professional's instructions.

## 2019-03-29 LAB — HCV AB SERPL QL IA: NEGATIVE

## 2019-03-29 NOTE — PROGRESS NOTES
Screening hep C negative.  Mild normocytic anemia, new compared to October.  A1c better 6.1 from 7.8.  CMP aside from glucose, normal.    He is due for colonoscopy, that was ordered previously but not performed.  Labs were done for weight loss.  I will mail him copy of his lab results, I will copy primary care doctor on these results.  Defer to him regarding any further evaluation.

## 2019-04-03 ENCOUNTER — TELEPHONE (OUTPATIENT)
Dept: FAMILY MEDICINE | Facility: CLINIC | Age: 59
End: 2019-04-03

## 2019-04-03 NOTE — TELEPHONE ENCOUNTER
----- Message from Carmen Machuca NP sent at 4/1/2019  3:28 PM CDT -----  Please notify patient that his fitkit was normal

## 2019-10-11 DIAGNOSIS — E11.9 TYPE 2 DIABETES MELLITUS WITHOUT COMPLICATION: ICD-10-CM

## 2019-10-25 DIAGNOSIS — E11.9 TYPE 2 DIABETES MELLITUS WITHOUT COMPLICATION: ICD-10-CM

## 2020-02-26 ENCOUNTER — HOSPITAL ENCOUNTER (EMERGENCY)
Facility: HOSPITAL | Age: 60
Discharge: HOME | End: 2020-02-26
Attending: EMERGENCY MEDICINE
Payer: MEDICARE

## 2020-02-26 VITALS
TEMPERATURE: 98 F | HEIGHT: 67 IN | WEIGHT: 170 LBS | RESPIRATION RATE: 18 BRPM | HEART RATE: 64 BPM | DIASTOLIC BLOOD PRESSURE: 89 MMHG | BODY MASS INDEX: 26.68 KG/M2 | SYSTOLIC BLOOD PRESSURE: 165 MMHG | OXYGEN SATURATION: 98 %

## 2020-02-26 DIAGNOSIS — N34.2 URETHRITIS: Primary | ICD-10-CM

## 2020-02-26 PROCEDURE — 25000003 PHARM REV CODE 250: Performed by: PHYSICIAN ASSISTANT

## 2020-02-26 PROCEDURE — 63600175 PHARM REV CODE 636 W HCPCS: Performed by: PHYSICIAN ASSISTANT

## 2020-02-26 PROCEDURE — 99284 EMERGENCY DEPT VISIT MOD MDM: CPT | Mod: 25

## 2020-02-26 PROCEDURE — 87491 CHLMYD TRACH DNA AMP PROBE: CPT

## 2020-02-26 PROCEDURE — 96372 THER/PROPH/DIAG INJ SC/IM: CPT

## 2020-02-26 RX ORDER — AZITHROMYCIN 250 MG/1
1000 TABLET, FILM COATED ORAL
Status: COMPLETED | OUTPATIENT
Start: 2020-02-26 | End: 2020-02-26

## 2020-02-26 RX ORDER — CEFTRIAXONE 250 MG/1
250 INJECTION, POWDER, FOR SOLUTION INTRAMUSCULAR; INTRAVENOUS
Status: COMPLETED | OUTPATIENT
Start: 2020-02-26 | End: 2020-02-26

## 2020-02-26 RX ORDER — LIDOCAINE HYDROCHLORIDE 10 MG/ML
5 INJECTION INFILTRATION; PERINEURAL
Status: COMPLETED | OUTPATIENT
Start: 2020-02-26 | End: 2020-02-26

## 2020-02-26 RX ADMIN — CEFTRIAXONE SODIUM 250 MG: 250 INJECTION, POWDER, FOR SOLUTION INTRAMUSCULAR; INTRAVENOUS at 12:02

## 2020-02-26 RX ADMIN — LIDOCAINE HYDROCHLORIDE 5 ML: 10 INJECTION, SOLUTION INFILTRATION; PERINEURAL at 12:02

## 2020-02-26 RX ADMIN — AZITHROMYCIN MONOHYDRATE 1000 MG: 250 TABLET ORAL at 12:02

## 2020-02-26 NOTE — ED PROVIDER NOTES
"Encounter Date: 2/26/2020    SCRIBE #1 NOTE: I, Faviola Hussein, am scribing for, and in the presence of,  Zev Reese PA-C. I have scribed the following portions of the note - Other sections scribed: HPI, ROS and PE.       History     Chief Complaint   Patient presents with    Penile Discharge     Pt reports "White discharge from penis that started yesterday.  I got burnt from getting head"     CC: Penile Discharge    HPI: This 59 y.o male, with a medical history of hypertension and type 2 diabetes mellitus, presents to the ED c/o white penile discharge since yesterday. Pt states, "I think I got burnt", noting that he recently engaged in sexual activity. He notes that he has been diagnosed with an STD in the past and received subsequent treatment. Additionally, pt reports experiencing a headache. He denies dysuria, testicular pain, fever, rash or any other associated symptoms. No treatment attempted PTA to the ED. No alleviating factors.    The history is provided by the patient.     Review of patient's allergies indicates:   Allergen Reactions    Bactrim [sulfamethoxazole-trimethoprim] Other (See Comments)     Patient states he gets really pale     Past Medical History:   Diagnosis Date    Hypertension     Type 2 diabetes mellitus 11/2018     Past Surgical History:   Procedure Laterality Date    HAND SURGERY  2016     Family History   Problem Relation Age of Onset    Diabetes Mother     Hypertension Mother      Social History     Tobacco Use    Smoking status: Never Smoker    Smokeless tobacco: Never Used   Substance Use Topics    Alcohol use: No     Frequency: Never    Drug use: No     Review of Systems   Constitutional: Negative for fever.   HENT: Negative for sore throat.    Respiratory: Negative for shortness of breath.    Cardiovascular: Negative for chest pain.   Gastrointestinal: Negative for nausea.   Genitourinary: Positive for discharge (white). Negative for dysuria and testicular pain. "   Musculoskeletal: Negative for back pain.   Skin: Negative for rash.   Neurological: Positive for headaches. Negative for weakness.       Physical Exam     Initial Vitals [02/26/20 1048]   BP Pulse Resp Temp SpO2   (!) 170/86 80 16 98 °F (36.7 °C) 98 %      MAP       --         Physical Exam    Nursing note and vitals reviewed.  Constitutional: He appears well-developed and well-nourished. No distress.   HENT:   Head: Normocephalic and atraumatic.   Right Ear: Tympanic membrane normal.   Left Ear: Tympanic membrane normal.   Nose: Nose normal.   Mouth/Throat: Uvula is midline, oropharynx is clear and moist and mucous membranes are normal.   Eyes: EOM are normal. Pupils are equal, round, and reactive to light.   Neck: Trachea normal, normal range of motion, full passive range of motion without pain and phonation normal. Neck supple. No stridor present. No spinous process tenderness and no muscular tenderness present. Normal range of motion present. No neck rigidity.   Cardiovascular: Normal rate and regular rhythm.   Pulmonary/Chest: Effort normal. No respiratory distress.   Abdominal: Soft. Bowel sounds are normal. There is no tenderness.   Genitourinary: Discharge found.   Genitourinary Comments: There is yellowish green penile discharge present with no genital lesions.   Musculoskeletal: Normal range of motion.   Neurological: He is alert and oriented to person, place, and time. He has normal strength.   Skin: Skin is warm and dry. Capillary refill takes less than 2 seconds. No rash noted.   Psychiatric: He has a normal mood and affect.         ED Course   Procedures  Labs Reviewed   C. TRACHOMATIS/N. GONORRHOEAE BY AMP DNA          Imaging Results    None          Medical Decision Making:   ED Management:  59-year-old male with history exam consistent with urethritis likely secondary to gonorrhea.  He is well-appearing in no distress. Yellowish green discharge observed on exam.  No evidence of disseminated  infection, syphilis, herpes, HIV infection, or epididymitis.  Will treat empirically.  Patient instructed to inform sexual partners, and abstain from intercourse until treatment complete.            Scribe Attestation:   Scribe #1: I performed the above scribed service and the documentation accurately describes the services I performed. I attest to the accuracy of the note.                          Clinical Impression:       ICD-10-CM ICD-9-CM   1. Urethritis N34.2 597.80             ED Disposition Condition    Discharge Stable        ED Prescriptions     None        Follow-up Information     Follow up With Specialties Details Why Contact Info    Samuel Santa MD Family Medicine Schedule an appointment as soon as possible for a visit in 1 week For follow-up care [address] [facility] [id number] [phone]      [facility] Emergency Medicine Go to  If symptoms worsen [address]  [facility] [address] [phone]                      I, Zev Reese, personally performed the services described in this documentation. All medical record entries made by the scribe were at my direction and in my presence. I have reviewed the chart and agree that the record reflects my personal performance and is accurate and complete.               Zev Reese, PAJeremiasC  02/26/20 2398

## 2020-02-28 LAB
C TRACH DNA SPEC QL NAA+PROBE: NOT DETECTED
N GONORRHOEA DNA SPEC QL NAA+PROBE: DETECTED

## 2020-04-03 DIAGNOSIS — R80.9 CONTROLLED TYPE 2 DIABETES MELLITUS WITH MICROALBUMINURIA, WITHOUT LONG-TERM CURRENT USE OF INSULIN: ICD-10-CM

## 2020-04-03 DIAGNOSIS — E11.29 CONTROLLED TYPE 2 DIABETES MELLITUS WITH MICROALBUMINURIA, WITHOUT LONG-TERM CURRENT USE OF INSULIN: ICD-10-CM

## 2020-10-02 ENCOUNTER — HOSPITAL ENCOUNTER (OUTPATIENT)
Facility: OTHER | Age: 60
Discharge: HOME | End: 2020-10-04
Attending: EMERGENCY MEDICINE | Admitting: INTERNAL MEDICINE
Payer: MEDICARE

## 2020-10-02 DIAGNOSIS — R11.2 INTRACTABLE NAUSEA AND VOMITING: Primary | ICD-10-CM

## 2020-10-02 DIAGNOSIS — R93.5 ABNORMAL CT OF THE ABDOMEN: ICD-10-CM

## 2020-10-02 PROBLEM — R63.4 WEIGHT LOSS: Status: ACTIVE | Noted: 2020-10-02

## 2020-10-02 LAB
ALBUMIN SERPL BCP-MCNC: 4.9 G/DL (ref 3.5–5.2)
ALP SERPL-CCNC: 61 U/L (ref 55–135)
ALT SERPL W/O P-5'-P-CCNC: 21 U/L (ref 10–44)
AMPHET+METHAMPHET UR QL: NEGATIVE
ANION GAP SERPL CALC-SCNC: 13 MMOL/L (ref 8–16)
AST SERPL-CCNC: 18 U/L (ref 10–40)
BARBITURATES UR QL SCN>200 NG/ML: NEGATIVE
BASOPHILS # BLD AUTO: 0.01 K/UL (ref 0–0.2)
BASOPHILS NFR BLD: 0.1 % (ref 0–1.9)
BENZODIAZ UR QL SCN>200 NG/ML: NEGATIVE
BILIRUB SERPL-MCNC: 0.9 MG/DL (ref 0.1–1)
BILIRUB UR QL STRIP: NEGATIVE
BUN SERPL-MCNC: 11 MG/DL (ref 6–20)
BZE UR QL SCN: NEGATIVE
CALCIUM SERPL-MCNC: 10.4 MG/DL (ref 8.7–10.5)
CANNABINOIDS UR QL SCN: NORMAL
CHLORIDE SERPL-SCNC: 99 MMOL/L (ref 95–110)
CLARITY UR: ABNORMAL
CO2 SERPL-SCNC: 25 MMOL/L (ref 23–29)
COLOR UR: YELLOW
CREAT SERPL-MCNC: 1.2 MG/DL (ref 0.5–1.4)
CREAT UR-MCNC: 59.3 MG/DL (ref 23–375)
CTP QC/QA: YES
DIFFERENTIAL METHOD: NORMAL
EOSINOPHIL # BLD AUTO: 0 K/UL (ref 0–0.5)
EOSINOPHIL NFR BLD: 0.1 % (ref 0–8)
ERYTHROCYTE [DISTWIDTH] IN BLOOD BY AUTOMATED COUNT: 12.2 % (ref 11.5–14.5)
EST. GFR  (AFRICAN AMERICAN): >60 ML/MIN/1.73 M^2
EST. GFR  (NON AFRICAN AMERICAN): >60 ML/MIN/1.73 M^2
GLUCOSE SERPL-MCNC: 218 MG/DL (ref 70–110)
GLUCOSE UR QL STRIP: ABNORMAL
HCT VFR BLD AUTO: 46.2 % (ref 40–54)
HCV AB SERPL QL IA: NEGATIVE
HGB BLD-MCNC: 15.3 G/DL (ref 14–18)
HGB UR QL STRIP: NEGATIVE
HIV 1+2 AB+HIV1 P24 AG SERPL QL IA: NEGATIVE
IMM GRANULOCYTES # BLD AUTO: 0.02 K/UL (ref 0–0.04)
IMM GRANULOCYTES NFR BLD AUTO: 0.2 % (ref 0–0.5)
KETONES UR QL STRIP: ABNORMAL
LDH SERPL L TO P-CCNC: 321 U/L (ref 110–260)
LEUKOCYTE ESTERASE UR QL STRIP: NEGATIVE
LIPASE SERPL-CCNC: 13 U/L (ref 4–60)
LYMPHOCYTES # BLD AUTO: 1.6 K/UL (ref 1–4.8)
LYMPHOCYTES NFR BLD: 19.8 % (ref 18–48)
MCH RBC QN AUTO: 30.4 PG (ref 27–31)
MCHC RBC AUTO-ENTMCNC: 33.1 G/DL (ref 32–36)
MCV RBC AUTO: 92 FL (ref 82–98)
METHADONE UR QL SCN>300 NG/ML: NEGATIVE
MONOCYTES # BLD AUTO: 0.6 K/UL (ref 0.3–1)
MONOCYTES NFR BLD: 7.4 % (ref 4–15)
NEUTROPHILS # BLD AUTO: 6 K/UL (ref 1.8–7.7)
NEUTROPHILS NFR BLD: 72.4 % (ref 38–73)
NITRITE UR QL STRIP: NEGATIVE
NRBC BLD-RTO: 0 /100 WBC
OPIATES UR QL SCN: NEGATIVE
PCP UR QL SCN>25 NG/ML: NEGATIVE
PH UR STRIP: 8 [PH] (ref 5–8)
PLATELET # BLD AUTO: 171 K/UL (ref 150–350)
PMV BLD AUTO: 12 FL (ref 9.2–12.9)
POCT GLUCOSE: 158 MG/DL (ref 70–110)
POCT GLUCOSE: 197 MG/DL (ref 70–110)
POTASSIUM SERPL-SCNC: 3.7 MMOL/L (ref 3.5–5.1)
PROT SERPL-MCNC: 8.5 G/DL (ref 6–8.4)
PROT UR QL STRIP: NEGATIVE
RBC # BLD AUTO: 5.03 M/UL (ref 4.6–6.2)
SARS-COV-2 RDRP RESP QL NAA+PROBE: NEGATIVE
SODIUM SERPL-SCNC: 137 MMOL/L (ref 136–145)
SP GR UR STRIP: 1.01 (ref 1–1.03)
TOXICOLOGY INFORMATION: NORMAL
TSH SERPL DL<=0.005 MIU/L-ACNC: 0.6 UIU/ML (ref 0.4–4)
URN SPEC COLLECT METH UR: ABNORMAL
UROBILINOGEN UR STRIP-ACNC: NEGATIVE EU/DL
WBC # BLD AUTO: 8.27 K/UL (ref 3.9–12.7)

## 2020-10-02 PROCEDURE — 96376 TX/PRO/DX INJ SAME DRUG ADON: CPT

## 2020-10-02 PROCEDURE — 86803 HEPATITIS C AB TEST: CPT

## 2020-10-02 PROCEDURE — 86703 HIV-1/HIV-2 1 RESULT ANTBDY: CPT

## 2020-10-02 PROCEDURE — 25500020 PHARM REV CODE 255: Performed by: PHYSICIAN ASSISTANT

## 2020-10-02 PROCEDURE — 63600175 PHARM REV CODE 636 W HCPCS: Performed by: EMERGENCY MEDICINE

## 2020-10-02 PROCEDURE — 99285 EMERGENCY DEPT VISIT HI MDM: CPT | Mod: 25

## 2020-10-02 PROCEDURE — 96361 HYDRATE IV INFUSION ADD-ON: CPT

## 2020-10-02 PROCEDURE — 81003 URINALYSIS AUTO W/O SCOPE: CPT | Mod: 59

## 2020-10-02 PROCEDURE — 83615 LACTATE (LD) (LDH) ENZYME: CPT

## 2020-10-02 PROCEDURE — 96374 THER/PROPH/DIAG INJ IV PUSH: CPT | Mod: 59

## 2020-10-02 PROCEDURE — 63600175 PHARM REV CODE 636 W HCPCS: Performed by: INTERNAL MEDICINE

## 2020-10-02 PROCEDURE — 84443 ASSAY THYROID STIM HORMONE: CPT

## 2020-10-02 PROCEDURE — U0002 COVID-19 LAB TEST NON-CDC: HCPCS | Performed by: EMERGENCY MEDICINE

## 2020-10-02 PROCEDURE — 85025 COMPLETE CBC W/AUTO DIFF WBC: CPT

## 2020-10-02 PROCEDURE — 83036 HEMOGLOBIN GLYCOSYLATED A1C: CPT

## 2020-10-02 PROCEDURE — 80053 COMPREHEN METABOLIC PANEL: CPT

## 2020-10-02 PROCEDURE — G0378 HOSPITAL OBSERVATION PER HR: HCPCS

## 2020-10-02 PROCEDURE — 99220 PR INITIAL OBSERVATION CARE,LEVL III: ICD-10-PCS | Mod: ,,, | Performed by: INTERNAL MEDICINE

## 2020-10-02 PROCEDURE — 25000003 PHARM REV CODE 250: Performed by: PHYSICIAN ASSISTANT

## 2020-10-02 PROCEDURE — 80307 DRUG TEST PRSMV CHEM ANLYZR: CPT

## 2020-10-02 PROCEDURE — 96375 TX/PRO/DX INJ NEW DRUG ADDON: CPT

## 2020-10-02 PROCEDURE — 99220 PR INITIAL OBSERVATION CARE,LEVL III: CPT | Mod: ,,, | Performed by: INTERNAL MEDICINE

## 2020-10-02 PROCEDURE — 63600175 PHARM REV CODE 636 W HCPCS: Performed by: PHYSICIAN ASSISTANT

## 2020-10-02 PROCEDURE — 83690 ASSAY OF LIPASE: CPT

## 2020-10-02 RX ORDER — MORPHINE SULFATE 2 MG/ML
2 INJECTION, SOLUTION INTRAMUSCULAR; INTRAVENOUS EVERY 4 HOURS PRN
Status: DISCONTINUED | OUTPATIENT
Start: 2020-10-02 | End: 2020-10-04 | Stop reason: HOSPADM

## 2020-10-02 RX ORDER — SODIUM CHLORIDE 0.9 % (FLUSH) 0.9 %
10 SYRINGE (ML) INJECTION
Status: CANCELLED | OUTPATIENT
Start: 2020-10-02

## 2020-10-02 RX ORDER — ONDANSETRON 2 MG/ML
4 INJECTION INTRAMUSCULAR; INTRAVENOUS
Status: COMPLETED | OUTPATIENT
Start: 2020-10-02 | End: 2020-10-02

## 2020-10-02 RX ORDER — SODIUM CHLORIDE 0.9 % (FLUSH) 0.9 %
10 SYRINGE (ML) INJECTION
Status: DISCONTINUED | OUTPATIENT
Start: 2020-10-02 | End: 2020-10-04 | Stop reason: HOSPADM

## 2020-10-02 RX ORDER — INSULIN ASPART 100 [IU]/ML
0-5 INJECTION, SOLUTION INTRAVENOUS; SUBCUTANEOUS
Status: DISCONTINUED | OUTPATIENT
Start: 2020-10-02 | End: 2020-10-04 | Stop reason: HOSPADM

## 2020-10-02 RX ORDER — KETOROLAC TROMETHAMINE 30 MG/ML
10 INJECTION, SOLUTION INTRAMUSCULAR; INTRAVENOUS
Status: COMPLETED | OUTPATIENT
Start: 2020-10-02 | End: 2020-10-02

## 2020-10-02 RX ORDER — ACETAMINOPHEN 325 MG/1
650 TABLET ORAL EVERY 8 HOURS PRN
Status: DISCONTINUED | OUTPATIENT
Start: 2020-10-02 | End: 2020-10-04 | Stop reason: HOSPADM

## 2020-10-02 RX ORDER — IBUPROFEN 200 MG
16 TABLET ORAL
Status: DISCONTINUED | OUTPATIENT
Start: 2020-10-02 | End: 2020-10-04 | Stop reason: HOSPADM

## 2020-10-02 RX ORDER — HYOSCYAMINE SULFATE 0.12 MG/1
0.12 TABLET SUBLINGUAL
Status: COMPLETED | OUTPATIENT
Start: 2020-10-02 | End: 2020-10-02

## 2020-10-02 RX ORDER — TALC
6 POWDER (GRAM) TOPICAL NIGHTLY PRN
Status: DISCONTINUED | OUTPATIENT
Start: 2020-10-02 | End: 2020-10-04 | Stop reason: HOSPADM

## 2020-10-02 RX ORDER — ONDANSETRON 2 MG/ML
4 INJECTION INTRAMUSCULAR; INTRAVENOUS EVERY 8 HOURS PRN
Status: DISCONTINUED | OUTPATIENT
Start: 2020-10-02 | End: 2020-10-04 | Stop reason: HOSPADM

## 2020-10-02 RX ORDER — PANTOPRAZOLE SODIUM 40 MG/1
40 TABLET, DELAYED RELEASE ORAL DAILY
Status: DISCONTINUED | OUTPATIENT
Start: 2020-10-03 | End: 2020-10-04 | Stop reason: HOSPADM

## 2020-10-02 RX ORDER — MORPHINE SULFATE 4 MG/ML
4 INJECTION, SOLUTION INTRAMUSCULAR; INTRAVENOUS EVERY 4 HOURS PRN
Status: DISCONTINUED | OUTPATIENT
Start: 2020-10-02 | End: 2020-10-04 | Stop reason: HOSPADM

## 2020-10-02 RX ORDER — MORPHINE SULFATE 4 MG/ML
4 INJECTION, SOLUTION INTRAMUSCULAR; INTRAVENOUS
Status: COMPLETED | OUTPATIENT
Start: 2020-10-02 | End: 2020-10-02

## 2020-10-02 RX ORDER — SODIUM CHLORIDE, SODIUM LACTATE, POTASSIUM CHLORIDE, CALCIUM CHLORIDE 600; 310; 30; 20 MG/100ML; MG/100ML; MG/100ML; MG/100ML
INJECTION, SOLUTION INTRAVENOUS CONTINUOUS
Status: DISCONTINUED | OUTPATIENT
Start: 2020-10-02 | End: 2020-10-04

## 2020-10-02 RX ORDER — LOSARTAN POTASSIUM 25 MG/1
25 TABLET ORAL DAILY
Status: CANCELLED | OUTPATIENT
Start: 2020-10-02

## 2020-10-02 RX ORDER — GLUCAGON 1 MG
1 KIT INJECTION
Status: DISCONTINUED | OUTPATIENT
Start: 2020-10-02 | End: 2020-10-04 | Stop reason: HOSPADM

## 2020-10-02 RX ORDER — ACETAMINOPHEN 325 MG/1
650 TABLET ORAL EVERY 4 HOURS PRN
Status: DISCONTINUED | OUTPATIENT
Start: 2020-10-02 | End: 2020-10-04 | Stop reason: HOSPADM

## 2020-10-02 RX ORDER — IBUPROFEN 200 MG
24 TABLET ORAL
Status: DISCONTINUED | OUTPATIENT
Start: 2020-10-02 | End: 2020-10-04 | Stop reason: HOSPADM

## 2020-10-02 RX ADMIN — MORPHINE SULFATE 4 MG: 4 INJECTION, SOLUTION INTRAMUSCULAR; INTRAVENOUS at 06:10

## 2020-10-02 RX ADMIN — ONDANSETRON 4 MG: 2 INJECTION INTRAMUSCULAR; INTRAVENOUS at 06:10

## 2020-10-02 RX ADMIN — SODIUM CHLORIDE 1000 ML: 0.9 INJECTION, SOLUTION INTRAVENOUS at 11:10

## 2020-10-02 RX ADMIN — MORPHINE SULFATE 4 MG: 4 INJECTION, SOLUTION INTRAMUSCULAR; INTRAVENOUS at 02:10

## 2020-10-02 RX ADMIN — MORPHINE SULFATE 2 MG: 2 INJECTION, SOLUTION INTRAMUSCULAR; INTRAVENOUS at 11:10

## 2020-10-02 RX ADMIN — SODIUM CHLORIDE 1000 ML: 0.9 INJECTION, SOLUTION INTRAVENOUS at 10:10

## 2020-10-02 RX ADMIN — SODIUM CHLORIDE, SODIUM LACTATE, POTASSIUM CHLORIDE, AND CALCIUM CHLORIDE: .6; .31; .03; .02 INJECTION, SOLUTION INTRAVENOUS at 06:10

## 2020-10-02 RX ADMIN — ONDANSETRON 4 MG: 2 INJECTION INTRAMUSCULAR; INTRAVENOUS at 10:10

## 2020-10-02 RX ADMIN — IOHEXOL 75 ML: 350 INJECTION, SOLUTION INTRAVENOUS at 11:10

## 2020-10-02 RX ADMIN — HYOSCYAMINE SULFATE 0.12 MG: 0.12 TABLET ORAL; SUBLINGUAL at 11:10

## 2020-10-02 RX ADMIN — KETOROLAC TROMETHAMINE 10 MG: 30 INJECTION, SOLUTION INTRAMUSCULAR at 01:10

## 2020-10-02 NOTE — HPI
"Per ED:  "Afebrile 60-year-old male with PMH of hypertension diabetes mellitus presents the ED for evaluation of generalized abdominal pain with associated nausea and vomiting.  Patient reports that the abdominal pain is located to the generalized abdomen.  Does report few episodes of loose stool.  He denies any blood in either emesis or stool.  He does report similar episode 1 year ago was told probable viral etiology.  He has not tried any medications for the symptoms.  Does report some increased alcohol use, denies any tobacco use does report occasional marijuana use.  Denies any recent travel, sick contacts or suspected food contamination"    Patient is a 60 year old male with a PMH significant for T2D and HTN who presented with complaints of abdominal pain with N/V.  Patient states he started with nausea yesterday morning and then developed generalized abdominal pain last night with one episode of emesis - non-bloody.  No radiation of pain to back.  No ETOH use.  He does smoke marijuana daily.  He had recurrent pain with n/v x 2 this morning, then came to ED.  He had loose stool this morning - no melena or hematochezia.  He admits to a 15 pound weight loss in past month.  Poor appetite with sour taste in mouth.  No headache or neck pain.  No cough or SOB.  No NS.  He admits to hot and cold spells.  No dysuria.  Currently with no abdominal pain.  "

## 2020-10-02 NOTE — HOSPITAL COURSE
Patient is a 60 year old male with a PMH significant for T2D and HTN who presented with complaints of abdominal pain with N/V.  Patient states he started with nausea yesterday morning and then developed generalized abdominal pain last night with one episode of emesis.  CT A/P showed non rotation of the bowel with small bowel loops on the right and much of the colon on the left.  Labs were unremarkable, including normal Lipase.  Patient was made NPO and Surgery consulted.  See below

## 2020-10-02 NOTE — ASSESSMENT & PLAN NOTE
15 pounds in past month per patient.  Unclear if related to above  No cervical LAD  HIV 1/2 and HCV negative  -Check TSH  -Check LDH

## 2020-10-02 NOTE — ED PROVIDER NOTES
Encounter Date: 10/2/2020       History     Chief Complaint   Patient presents with    Abdominal Pain     x3 days. pt reports n/v     Afebrile 60-year-old male with PMH of hypertension diabetes mellitus presents the ED for evaluation of generalized abdominal pain with associated nausea and vomiting.  Patient reports that the abdominal pain is located to the generalized abdomen.  Does report few episodes of loose stool.  He denies any blood in either emesis or stool.  He does report similar episode 1 year ago was told probable viral etiology.  He has not tried any medications for the symptoms.  Does report some increased alcohol use, denies any tobacco use does report occasional marijuana use.  Denies any recent travel, sick contacts or suspected food contamination.    The history is provided by the patient.     Review of patient's allergies indicates:   Allergen Reactions    Bactrim [sulfamethoxazole-trimethoprim] Other (See Comments)     Patient states he gets really pale     Past Medical History:   Diagnosis Date    Hypertension     Type 2 diabetes mellitus 11/2018     Past Surgical History:   Procedure Laterality Date    HAND SURGERY  2016     Family History   Problem Relation Age of Onset    Diabetes Mother     Hypertension Mother      Social History     Tobacco Use    Smoking status: Never Smoker    Smokeless tobacco: Never Used   Substance Use Topics    Alcohol use: No     Frequency: Never    Drug use: No     Review of Systems   Constitutional: Negative for fever.   HENT: Negative for congestion and sore throat.    Respiratory: Negative for cough and shortness of breath.    Cardiovascular: Negative for chest pain.   Gastrointestinal: Positive for abdominal pain, diarrhea, nausea and vomiting.   Genitourinary: Negative for discharge, dysuria, flank pain and hematuria.   Musculoskeletal: Negative for arthralgias, back pain and myalgias.   Skin: Negative for rash.   Allergic/Immunologic: Positive for  immunocompromised state (DM).   Neurological: Negative for weakness.   Hematological: Does not bruise/bleed easily.       Physical Exam     Initial Vitals [10/02/20 0950]   BP Pulse Resp Temp SpO2   (!) 175/118 61 18 98.3 °F (36.8 °C) 98 %      MAP       --         Physical Exam    Nursing note and vitals reviewed.  Constitutional: Vital signs are normal. He appears well-developed and well-nourished. He is cooperative.  Non-toxic appearance. He does not appear ill. He appears distressed.   HENT:   Head: Normocephalic and atraumatic.   Mouth/Throat: Mucous membranes are dry.   Eyes: Conjunctivae and lids are normal.   Neck: Trachea normal and normal range of motion. Neck supple. No stridor present. No tracheal deviation present.   Cardiovascular: Normal rate and regular rhythm.   Pulmonary/Chest: Breath sounds normal. No respiratory distress. He has no wheezes.   Abdominal: Soft. Normal appearance. There is abdominal tenderness in the right lower quadrant and suprapubic area. There is no rebound and no guarding.   Neurological: He is alert and oriented to person, place, and time. GCS eye subscore is 4. GCS verbal subscore is 5. GCS motor subscore is 6.   Skin: Skin is warm, dry and intact. No rash noted.   Psychiatric: He has a normal mood and affect. His speech is normal and behavior is normal. Thought content normal.         ED Course   Procedures  Labs Reviewed   URINALYSIS, REFLEX TO URINE CULTURE - Abnormal; Notable for the following components:       Result Value    Appearance, UA Hazy (*)     Glucose, UA 2+ (*)     Ketones, UA Trace (*)     All other components within normal limits    Narrative:     Specimen Source->Urine   COMPREHENSIVE METABOLIC PANEL - Abnormal; Notable for the following components:    Glucose 218 (*)     Total Protein 8.5 (*)     All other components within normal limits   LACTATE DEHYDROGENASE - Abnormal; Notable for the following components:     (*)     All other components within  normal limits   POCT GLUCOSE - Abnormal; Notable for the following components:    POCT Glucose 197 (*)     All other components within normal limits   HIV 1 / 2 ANTIBODY   HEPATITIS C ANTIBODY   CBC W/ AUTO DIFFERENTIAL   LIPASE   DRUG SCREEN PANEL, URINE EMERGENCY    Narrative:     Specimen Source->Urine   TSH   SARS-COV-2 RDRP GENE          Imaging Results          CT Abdomen Pelvis With Contrast (Final result)  Result time 10/02/20 11:45:54    Final result by Kathie Barnes MD (10/02/20 11:45:54)                 Impression:      Suspect non rotation of the bowel with small bowel loops on the right and much of the colon on the left.  I do not see the appendix, though I see no significant inflammatory stranding or free fluid.  If symptoms persist or worsen, consider repeat imaging.      Electronically signed by: Kathie Barnes  Date:    10/02/2020  Time:    11:45             Narrative:    EXAMINATION:  CT ABDOMEN PELVIS WITH CONTRAST    CLINICAL HISTORY:  RLQ abdominal pain, appendicitis suspected (Age => 15y);    TECHNIQUE:  Low dose axial images, sagittal and coronal reformations were obtained from the lung bases to the pubic symphysis following the IV administration of 75 mL of Omnipaque 350.    COMPARISON:  None.    FINDINGS:  The lung bases are clear.    The liver is homogeneous.  Gallbladder is unremarkable.  Spleen and pancreas are unremarkable.    Right adrenal gland is normal.  Mild diffuse thickening left adrenal gland.  Kidneys concentrate contrast appropriately.  Left renal cyst measures 4.5 cm.  The urinary bladder is unremarkable.    Aorta is normal caliber.  No retroperitoneal lymph node enlargement.    Stomach and loops of bowel are normal caliber.  The duodenum does not appear to cross midline.  Much of the small bowel appears located on the right abdomen in the colon on the left which can be seen with non rotation.  I do not see the appendix.  I see no significant inflammatory stranding.  No  significant free fluid in the pelvis.                                 Medical Decision Making:   Differential Diagnosis:   Differential Diagnosis includes, but is not limited to:  mesenteric ischemia, perforated viscous, MI/ACS, SBO/volvulus, incarcerated/strangulated hernia, intussusception, ileus, appendicitis, cholecystitis, cholangitis, diverticulitis, esophagitis, hepatitis, nephrolithiasis, pancreatitis, gastroenteritis, colitis, IBD/IBS, biliary colic, GERD, PUD, constipation, UTI/pyelonephritis,  disorder.    Clinical Tests:   Lab Tests: Ordered and Reviewed  Radiological Study: Ordered and Reviewed  ED Management:  Pt remained with nausea, abdominal pain despite multiple medications.  CT with no acute infection or obstruction, ?malrotation of gut.  Patient with mildly tender abdomen nondistended and no peritoneal signs.  Discussed with Dr. Torres, who will consult on patient.   Placed in obs under Dr. Sanford.    Other:   I have discussed this case with another health care provider.       <> Summary of the Discussion: Dr. Melissa Sanford                             Clinical Impression:     ICD-10-CM ICD-9-CM   1. Intractable nausea and vomiting  R11.2 536.2   2. Abnormal CT of the abdomen  R93.5 793.6                          ED Disposition Condition    Observation                             Bisi Waters MD  10/03/20 0931

## 2020-10-02 NOTE — ASSESSMENT & PLAN NOTE
Started 1 day PTA.  Imaging below with nonrotation of bowel.  Also daily marijuana use, so cannabinoid hyperemesis is a concern as well.     CT A/P with - Suspect non rotation of the bowel with small bowel loops on the right and much of the colon on the left.  I do not see the appendix, though I see no significant inflammatory stranding or free fluid.  If symptoms persist or worsen, consider repeat imaging.    Labs:  WBC normal.    Lipase normal  COVID screen negative  Tox positive for THC  HIV negative  HCV negative    Plan:  Surgery Consult  Anti-emetics and PPI  Clear Liquid diet  IV fluids for now

## 2020-10-02 NOTE — H&P
"[facility] Hospital Medicine  History & Physical    Patient Name: Sidney Taylor  MRN: [id number]  Admission Date: 10/2/2020  Attending Physician: Slim Sanford MD   Primary Care Provider: Samuel Santa MD         Patient information was obtained from parent, past medical records and ER records.     Subjective:     Principal Problem:Intractable nausea and vomiting    Chief Complaint:   Chief Complaint   Patient presents with    Abdominal Pain     x3 days. pt reports n/v        HPI: Per ED:  "Afebrile 60-year-old male with PMH of hypertension diabetes mellitus presents the ED for evaluation of generalized abdominal pain with associated nausea and vomiting.  Patient reports that the abdominal pain is located to the generalized abdomen.  Does report few episodes of loose stool.  He denies any blood in either emesis or stool.  He does report similar episode 1 year ago was told probable viral etiology.  He has not tried any medications for the symptoms.  Does report some increased alcohol use, denies any tobacco use does report occasional marijuana use.  Denies any recent travel, sick contacts or suspected food contamination"    Patient is a 60 year old male with a PMH significant for T2D and HTN who presented with complaints of abdominal pain with N/V.  Patient states he started with nausea yesterday morning and then developed generalized abdominal pain last night with one episode of emesis - non-bloody.  No radiation of pain to back.  No ETOH use.  He does smoke marijuana daily.  He had recurrent pain with n/v x 2 this morning, then came to ED.  He had loose stool this morning - no melena or hematochezia.  He admits to a 15 pound weight loss in past month.  Poor appetite with sour taste in mouth.  No headache or neck pain.  No cough or SOB.  No NS.  He admits to hot and cold spells.  No dysuria.  Currently with no abdominal pain.    Past Medical History:   Diagnosis Date    Hypertension     Type " 2 diabetes mellitus 11/2018       Past Surgical History:   Procedure Laterality Date    HAND SURGERY  2016       Review of patient's allergies indicates:   Allergen Reactions    Bactrim [sulfamethoxazole-trimethoprim] Other (See Comments)     Patient states he gets really pale       No current facility-administered medications on file prior to encounter.      Current Outpatient Medications on File Prior to Encounter   Medication Sig    blood sugar diagnostic Strp 1 strip by Misc.(Non-Drug; Combo Route) route 3 (three) times daily.    blood-glucose meter kit Use as instructed    glipiZIDE (GLUCOTROL) 2.5 MG TR24 Take 1 tablet (2.5 mg total) by mouth daily with breakfast.    lancets Misc 1 application by Misc.(Non-Drug; Combo Route) route 3 (three) times daily.    losartan (COZAAR) 25 MG tablet Take 1 tablet (25 mg total) by mouth once daily.    sildenafil (REVATIO) 20 mg Tab Take 1 tablet (20 mg total) by mouth daily as needed.     Family History     Problem Relation (Age of Onset)    Diabetes Mother    Hypertension Mother        Tobacco Use    Smoking status: Never Smoker    Smokeless tobacco: Never Used   Substance and Sexual Activity    Alcohol use: No     Frequency: Never    Drug use: No    Sexual activity: Yes     Partners: Female     Birth control/protection: Condom     Review of Systems   Constitutional: Positive for appetite change and unexpected weight change (15 pounds in 1 month). Negative for diaphoresis and fever.   HENT: Negative for congestion and ear pain.    Eyes: Negative for pain and visual disturbance.   Respiratory: Negative for cough, chest tightness and shortness of breath.    Cardiovascular: Negative for chest pain, palpitations and leg swelling.   Gastrointestinal: Positive for abdominal pain, diarrhea (loose brown stool), nausea and vomiting. Negative for abdominal distention, blood in stool, constipation and rectal pain.   Endocrine: Positive for cold intolerance. Negative for  polydipsia, polyphagia and polyuria.   Genitourinary: Negative for difficulty urinating, discharge, dysuria and flank pain.   Musculoskeletal: Negative for arthralgias, myalgias, neck pain and neck stiffness.   Skin: Negative for rash.   Neurological: Negative for dizziness, light-headedness and headaches.   Hematological: Does not bruise/bleed easily.   Psychiatric/Behavioral: Negative for agitation and behavioral problems.     Objective:     Vital Signs (Most Recent):  Temp: 98.2 °F (36.8 °C) (10/02/20 1230)  Pulse: (!) 55 (10/02/20 1341)  Resp: 19 (10/02/20 1411)  BP: 121/81 (10/02/20 1340)  SpO2: 100 % (10/02/20 1340) Vital Signs (24h Range):  Temp:  [98.2 °F (36.8 °C)-98.3 °F (36.8 °C)] 98.2 °F (36.8 °C)  Pulse:  [55-64] 55  Resp:  [16-19] 19  SpO2:  [98 %-100 %] 100 %  BP: (121-175)/() 121/81     Weight: 72.6 kg (160 lb)  Body mass index is 24.33 kg/m².    Physical Exam  Constitutional:       General: He is not in acute distress.     Appearance: He is normal weight. He is not ill-appearing.   HENT:      Head: Normocephalic and atraumatic.      Right Ear: External ear normal.      Left Ear: External ear normal.      Nose: Nose normal.      Mouth/Throat:      Mouth: Mucous membranes are moist.      Pharynx: Oropharynx is clear.   Eyes:      General: No scleral icterus.     Conjunctiva/sclera: Conjunctivae normal.   Neck:      Musculoskeletal: Normal range of motion and neck supple.   Cardiovascular:      Rate and Rhythm: Normal rate and regular rhythm.      Pulses: Normal pulses.      Heart sounds: Normal heart sounds. No murmur.   Pulmonary:      Effort: Pulmonary effort is normal.      Breath sounds: Normal breath sounds. No wheezing or rales.   Abdominal:      General: Abdomen is flat. Bowel sounds are normal. There is no distension (diffuse).      Tenderness: There is abdominal tenderness. There is no guarding or rebound.   Musculoskeletal: Normal range of motion.      Right lower leg: No edema.       Left lower leg: No edema.   Lymphadenopathy:      Cervical: No cervical adenopathy.   Skin:     General: Skin is warm and dry.   Neurological:      General: No focal deficit present.      Mental Status: He is alert and oriented to person, place, and time.   Psychiatric:         Mood and Affect: Mood normal.             Significant Labs: All pertinent labs within the past 24 hours have been reviewed.    Significant Imaging: I have reviewed all pertinent imaging results/findings within the past 24 hours.    Assessment/Plan:     * Intractable nausea and vomiting  Started 1 day PTA.  Imaging below with nonrotation of bowel.  Also daily marijuana use, so cannabinoid hyperemesis is a concern as well.     CT A/P with - Suspect non rotation of the bowel with small bowel loops on the right and much of the colon on the left.  I do not see the appendix, though I see no significant inflammatory stranding or free fluid.  If symptoms persist or worsen, consider repeat imaging.    Labs:  WBC normal.    Lipase normal  COVID screen negative  Tox positive for THC  HIV negative  HCV negative    Plan:  Surgery Consult  Anti-emetics and PPI  Clear Liquid diet  IV fluids for now          Weight loss  15 pounds in past month per patient.  Unclear if related to above  No cervical LAD  HIV 1/2 and HCV negative  -Check TSH  -Check LDH      Essential hypertension, benign  Previously on Losartan 25mg, but d/c by his PCP  BP normal  Follow for now      Controlled type 2 diabetes mellitus with microalbuminuria, without long-term current use of insulin  Previously on Glipizide - was d/c by his PCP in past.  A1C in 3/2019 was 6.1  -Repeat A1C  -POCT Glucose qAC and HS with low dose correction SSI        VTE Risk Mitigation (From admission, onward)    None             Slim Sanford MD  Department of Hospital Medicine   [facility]

## 2020-10-02 NOTE — SUBJECTIVE & OBJECTIVE
Past Medical History:   Diagnosis Date    Hypertension     Type 2 diabetes mellitus 11/2018       Past Surgical History:   Procedure Laterality Date    HAND SURGERY  2016       Review of patient's allergies indicates:   Allergen Reactions    Bactrim [sulfamethoxazole-trimethoprim] Other (See Comments)     Patient states he gets really pale       No current facility-administered medications on file prior to encounter.      Current Outpatient Medications on File Prior to Encounter   Medication Sig    blood sugar diagnostic Strp 1 strip by Misc.(Non-Drug; Combo Route) route 3 (three) times daily.    blood-glucose meter kit Use as instructed    glipiZIDE (GLUCOTROL) 2.5 MG TR24 Take 1 tablet (2.5 mg total) by mouth daily with breakfast.    lancets Misc 1 application by Misc.(Non-Drug; Combo Route) route 3 (three) times daily.    losartan (COZAAR) 25 MG tablet Take 1 tablet (25 mg total) by mouth once daily.    sildenafil (REVATIO) 20 mg Tab Take 1 tablet (20 mg total) by mouth daily as needed.     Family History     Problem Relation (Age of Onset)    Diabetes Mother    Hypertension Mother        Tobacco Use    Smoking status: Never Smoker    Smokeless tobacco: Never Used   Substance and Sexual Activity    Alcohol use: No     Frequency: Never    Drug use: No    Sexual activity: Yes     Partners: Female     Birth control/protection: Condom     Review of Systems   Constitutional: Positive for appetite change and unexpected weight change (15 pounds in 1 month). Negative for diaphoresis and fever.   HENT: Negative for congestion and ear pain.    Eyes: Negative for pain and visual disturbance.   Respiratory: Negative for cough, chest tightness and shortness of breath.    Cardiovascular: Negative for chest pain, palpitations and leg swelling.   Gastrointestinal: Positive for abdominal pain, diarrhea (loose brown stool), nausea and vomiting. Negative for abdominal distention, blood in stool, constipation and  rectal pain.   Endocrine: Positive for cold intolerance. Negative for polydipsia, polyphagia and polyuria.   Genitourinary: Negative for difficulty urinating, discharge, dysuria and flank pain.   Musculoskeletal: Negative for arthralgias, myalgias, neck pain and neck stiffness.   Skin: Negative for rash.   Neurological: Negative for dizziness, light-headedness and headaches.   Hematological: Does not bruise/bleed easily.   Psychiatric/Behavioral: Negative for agitation and behavioral problems.     Objective:     Vital Signs (Most Recent):  Temp: 98.2 °F (36.8 °C) (10/02/20 1230)  Pulse: (!) 55 (10/02/20 1341)  Resp: 19 (10/02/20 1411)  BP: 121/81 (10/02/20 1340)  SpO2: 100 % (10/02/20 1340) Vital Signs (24h Range):  Temp:  [98.2 °F (36.8 °C)-98.3 °F (36.8 °C)] 98.2 °F (36.8 °C)  Pulse:  [55-64] 55  Resp:  [16-19] 19  SpO2:  [98 %-100 %] 100 %  BP: (121-175)/() 121/81     Weight: 72.6 kg (160 lb)  Body mass index is 24.33 kg/m².    Physical Exam  Constitutional:       General: He is not in acute distress.     Appearance: He is normal weight. He is not ill-appearing.   HENT:      Head: Normocephalic and atraumatic.      Right Ear: External ear normal.      Left Ear: External ear normal.      Nose: Nose normal.      Mouth/Throat:      Mouth: Mucous membranes are moist.      Pharynx: Oropharynx is clear.   Eyes:      General: No scleral icterus.     Conjunctiva/sclera: Conjunctivae normal.   Neck:      Musculoskeletal: Normal range of motion and neck supple.   Cardiovascular:      Rate and Rhythm: Normal rate and regular rhythm.      Pulses: Normal pulses.      Heart sounds: Normal heart sounds. No murmur.   Pulmonary:      Effort: Pulmonary effort is normal.      Breath sounds: Normal breath sounds. No wheezing or rales.   Abdominal:      General: Abdomen is flat. Bowel sounds are normal. There is no distension (diffuse).      Tenderness: There is abdominal tenderness. There is no guarding or rebound.    Musculoskeletal: Normal range of motion.      Right lower leg: No edema.      Left lower leg: No edema.   Lymphadenopathy:      Cervical: No cervical adenopathy.   Skin:     General: Skin is warm and dry.   Neurological:      General: No focal deficit present.      Mental Status: He is alert and oriented to person, place, and time.   Psychiatric:         Mood and Affect: Mood normal.             Significant Labs: All pertinent labs within the past 24 hours have been reviewed.    Significant Imaging: I have reviewed all pertinent imaging results/findings within the past 24 hours.

## 2020-10-02 NOTE — ED NOTES
60 y.o. male to ED with c.o. abdominal pain with associated nausea and vomiting since yesterday. Patient reports he ate a little bit of soup and then started feeling bad and threw it up. Patient reports inability to tolerate PO including water. Patient denies hematemesis, endorses chills, denies fever. Patient actively vomiting, PA aware. Patient awake, alert, and oriented x 4. No apparent distress noted. VS currently stable. Patient assisted onto stretcher and changed into a gown. Patient placed on cardiac monitor, continuous pulse oximetry and automatic blood pressure cuff. Bed placed in low locked position, side rails up x 2, call light is within reach of patient orientation to room and explanation of wait provided to patient, alarms set and turned on for monitor and pulse ox, awaiting MD evaluation and orders, family at bedside, will continue to monitor.

## 2020-10-02 NOTE — ASSESSMENT & PLAN NOTE
Previously on Glipizide - was d/c by his PCP in past.  A1C in 3/2019 was 6.1  -Repeat A1C  -POCT Glucose qAC and HS with low dose correction SSI

## 2020-10-02 NOTE — NURSING
Pt arrived on floor with IV out of his arm.  Pt refusing to uncover, stating he is freezing and that he needs medication.  Educated pt that to give medication he would need to uncover his arm and let us start a new IV.  New IV placed.  PRN pain and nausea meds given.  Pt oriented to room, call bell and urinal at bedside.  Pt now resting quietly, eyes closed.

## 2020-10-03 LAB
ANION GAP SERPL CALC-SCNC: 9 MMOL/L (ref 8–16)
BASOPHILS # BLD AUTO: 0.01 K/UL (ref 0–0.2)
BASOPHILS NFR BLD: 0.1 % (ref 0–1.9)
BUN SERPL-MCNC: 13 MG/DL (ref 6–20)
CALCIUM SERPL-MCNC: 8.9 MG/DL (ref 8.7–10.5)
CHLORIDE SERPL-SCNC: 106 MMOL/L (ref 95–110)
CO2 SERPL-SCNC: 24 MMOL/L (ref 23–29)
CREAT SERPL-MCNC: 1 MG/DL (ref 0.5–1.4)
DIFFERENTIAL METHOD: ABNORMAL
EOSINOPHIL # BLD AUTO: 0 K/UL (ref 0–0.5)
EOSINOPHIL NFR BLD: 0.2 % (ref 0–8)
ERYTHROCYTE [DISTWIDTH] IN BLOOD BY AUTOMATED COUNT: 12.4 % (ref 11.5–14.5)
EST. GFR  (AFRICAN AMERICAN): >60 ML/MIN/1.73 M^2
EST. GFR  (NON AFRICAN AMERICAN): >60 ML/MIN/1.73 M^2
ESTIMATED AVG GLUCOSE: 114 MG/DL (ref 68–131)
GLUCOSE SERPL-MCNC: 94 MG/DL (ref 70–110)
HBA1C MFR BLD HPLC: 5.6 % (ref 4–5.6)
HCT VFR BLD AUTO: 40 % (ref 40–54)
HGB BLD-MCNC: 13 G/DL (ref 14–18)
IMM GRANULOCYTES # BLD AUTO: 0.01 K/UL (ref 0–0.04)
IMM GRANULOCYTES NFR BLD AUTO: 0.1 % (ref 0–0.5)
LYMPHOCYTES # BLD AUTO: 2.4 K/UL (ref 1–4.8)
LYMPHOCYTES NFR BLD: 28.9 % (ref 18–48)
MAGNESIUM SERPL-MCNC: 1.8 MG/DL (ref 1.6–2.6)
MCH RBC QN AUTO: 30.6 PG (ref 27–31)
MCHC RBC AUTO-ENTMCNC: 32.5 G/DL (ref 32–36)
MCV RBC AUTO: 94 FL (ref 82–98)
MONOCYTES # BLD AUTO: 0.8 K/UL (ref 0.3–1)
MONOCYTES NFR BLD: 9.1 % (ref 4–15)
NEUTROPHILS # BLD AUTO: 5.1 K/UL (ref 1.8–7.7)
NEUTROPHILS NFR BLD: 61.6 % (ref 38–73)
NRBC BLD-RTO: 0 /100 WBC
PHOSPHATE SERPL-MCNC: 3.4 MG/DL (ref 2.7–4.5)
PLATELET # BLD AUTO: 142 K/UL (ref 150–350)
PMV BLD AUTO: 11.9 FL (ref 9.2–12.9)
POCT GLUCOSE: 108 MG/DL (ref 70–110)
POCT GLUCOSE: 135 MG/DL (ref 70–110)
POCT GLUCOSE: 184 MG/DL (ref 70–110)
POCT GLUCOSE: 186 MG/DL (ref 70–110)
POTASSIUM SERPL-SCNC: 3.9 MMOL/L (ref 3.5–5.1)
RBC # BLD AUTO: 4.25 M/UL (ref 4.6–6.2)
SODIUM SERPL-SCNC: 139 MMOL/L (ref 136–145)
WBC # BLD AUTO: 8.33 K/UL (ref 3.9–12.7)

## 2020-10-03 PROCEDURE — 25000003 PHARM REV CODE 250: Performed by: INTERNAL MEDICINE

## 2020-10-03 PROCEDURE — 96361 HYDRATE IV INFUSION ADD-ON: CPT

## 2020-10-03 PROCEDURE — 80048 BASIC METABOLIC PNL TOTAL CA: CPT

## 2020-10-03 PROCEDURE — 84100 ASSAY OF PHOSPHORUS: CPT

## 2020-10-03 PROCEDURE — 96376 TX/PRO/DX INJ SAME DRUG ADON: CPT

## 2020-10-03 PROCEDURE — 36415 COLL VENOUS BLD VENIPUNCTURE: CPT

## 2020-10-03 PROCEDURE — 85025 COMPLETE CBC W/AUTO DIFF WBC: CPT

## 2020-10-03 PROCEDURE — 94761 N-INVAS EAR/PLS OXIMETRY MLT: CPT

## 2020-10-03 PROCEDURE — 99226 PR SUBSEQUENT OBSERVATION CARE,LEVEL III: CPT | Mod: ,,, | Performed by: INTERNAL MEDICINE

## 2020-10-03 PROCEDURE — 63600175 PHARM REV CODE 636 W HCPCS: Performed by: INTERNAL MEDICINE

## 2020-10-03 PROCEDURE — 99226 PR SUBSEQUENT OBSERVATION CARE,LEVEL III: ICD-10-PCS | Mod: ,,, | Performed by: INTERNAL MEDICINE

## 2020-10-03 PROCEDURE — 83735 ASSAY OF MAGNESIUM: CPT

## 2020-10-03 PROCEDURE — G0378 HOSPITAL OBSERVATION PER HR: HCPCS

## 2020-10-03 RX ORDER — HYDROXYZINE PAMOATE 25 MG/1
25 CAPSULE ORAL EVERY 6 HOURS PRN
Status: DISCONTINUED | OUTPATIENT
Start: 2020-10-03 | End: 2020-10-04 | Stop reason: HOSPADM

## 2020-10-03 RX ADMIN — MORPHINE SULFATE 2 MG: 2 INJECTION, SOLUTION INTRAMUSCULAR; INTRAVENOUS at 11:10

## 2020-10-03 RX ADMIN — ONDANSETRON 4 MG: 2 INJECTION INTRAMUSCULAR; INTRAVENOUS at 11:10

## 2020-10-03 RX ADMIN — SODIUM CHLORIDE, SODIUM LACTATE, POTASSIUM CHLORIDE, AND CALCIUM CHLORIDE: .6; .31; .03; .02 INJECTION, SOLUTION INTRAVENOUS at 04:10

## 2020-10-03 RX ADMIN — MORPHINE SULFATE 2 MG: 2 INJECTION, SOLUTION INTRAMUSCULAR; INTRAVENOUS at 05:10

## 2020-10-03 RX ADMIN — HYDROXYZINE PAMOATE 25 MG: 25 CAPSULE ORAL at 03:10

## 2020-10-03 NOTE — PLAN OF CARE
AAOx4, RA. 22 RFA infusing. NPO initiated at midnight. Tele monitor maintained SR. BG monitored. Ambulatory w/ no assist. Urinal at BS. PRN med given for stomach pain. No NVD. No distress noted.     *Purposeful hourly rounding done. POC reviewed with pt. No falls or injuries on shift.  Bed low and locked. Call light in reach. Will continue to monitor.

## 2020-10-03 NOTE — NURSING
"Pt AAOx4, NAD noted. Pt anxious throughout shift, noted to be sweating, but will not take blankets off due to being "cold." Given Visatril x1. Pain medications given as needed. Full liquid diet placed, pt complained of nausea after eating. Zofran given, no episodes of vomiting. Pt ambulating without difficulty, remained free from falls or injury. Pt currently in bed, safety measures implemented. Will continue to monitor.   "

## 2020-10-03 NOTE — PLAN OF CARE
Received report on patient from Dave COMBS at 5pm. Patient very agitated and anxious, accidentally removed IV, new IV placed by RN x 1 attempt, fluids infusing without difficulty. Patient reports chills tremors anxiety nausea, vomiting, and stomach pain. PRN medication administered, secure sent to Dr. Sanford regarding possible symptoms of withdrawal. No new orders received. Will continue to monitor closely for any changes. Patient updated on plan of care.

## 2020-10-03 NOTE — PLAN OF CARE
Patient is alert and oriented with no communication barriers.      Prior to admission patient was independent with ADLs.   Patient denies the use of HH or DME        Patients PCP is correct on the face sheet      [facility] pharmacy is    [facility] [id number]  Phone: [phone] Fax: [phone]      Patients family will transport home at discharge.         No CM needs identified at this time.     10/03/20 1501   Discharge Assessment   Assessment Type Discharge Planning Assessment   Confirmed/corrected address and phone number on facesheet? Yes   Assessment information obtained from? Patient   Communicated expected length of stay with patient/caregiver yes   Prior to hospitilization cognitive status: Alert/Oriented   Prior to hospitalization functional status: Independent   Current cognitive status: Alert/Oriented   Current Functional Status: Independent   Lives With alone   Able to Return to Prior Arrangements yes   Is patient able to care for self after discharge? Yes   Readmission Within the Last 30 Days no previous admission in last 30 days   Patient currently being followed by outpatient case management? No   Patient currently receives any other outside agency services? No   Equipment Currently Used at Home none   Do you have any problems affording any of your prescribed medications? No   Is the patient taking medications as prescribed? yes   Does the patient have transportation home? Yes   Transportation Anticipated family or friend will provide   Discharge Plan A Home   Discharge Plan B Home with family   DME Needed Upon Discharge  none   Patient/Family in Agreement with Plan yes

## 2020-10-03 NOTE — ASSESSMENT & PLAN NOTE
15 pounds in past month per patient.  Unclear if related to above  No cervical LAD  HIV 1/2 and HCV negative  TSH normal  LDH mildly elevated  -Outpatient work-up if continues

## 2020-10-03 NOTE — ASSESSMENT & PLAN NOTE
Started 1 day PTA.  Imaging below with nonrotation of bowel.  Also daily marijuana use, so cannabinoid hyperemesis is a concern as well.   Feeling a little better this morning.    CT A/P with - Suspect non rotation of the bowel with small bowel loops on the right and much of the colon on the left.  I do not see the appendix, though I see no significant inflammatory stranding or free fluid.  If symptoms persist or worsen, consider repeat imaging.    Labs:  WBC normal.    Lipase normal  COVID screen negative  Tox positive for THC  HIV negative  HCV negative    Plan:  Surgery Consult - awaiting recommendations  Anti-emetics and PPI  Clear Liquid diet  IV fluids for now

## 2020-10-03 NOTE — CONSULTS
[facility]  Consult      Date of Consultation:10/3/2020    History of Present Illness:  This 60-year-old black male presents emergency room with generalized abdominal pain nausea and vomiting.  Workup in the emergency room with a CT scan showed possible non rotation of the bowel with small bowel loops on the right and much of the colon on the left.  The appendix was not visualized however there was no inflammatory stranding seen.  Patient had a similar episode 1 year ago.  Patient also states that he had a colonoscopy in the past but I am unable to retrieve that report.  This morning he is feeling better with minimal pain and hungry.  Spoke with his PCP who will follow up with the patient on Monday.    Past Surgical History:   Procedure Laterality Date    HAND SURGERY  2016          Physical Exam:  In no acute distress.  Afebrile.  Chest clear   Heart rate and rhythm regular  Abdomen Flat nondistended, soft, non tender    Impression:  Ileus resolved.    Recommend:  Full liquid diet.  If tolerated may be discharged later today.    Thank you for the opportunity of seeing Sidney Taylor in consultation

## 2020-10-03 NOTE — SUBJECTIVE & OBJECTIVE
Interval History: Patient overall feeling better.  No emesis since leaving ED.  No nausea this morning.  No further diarrhea.  Abdominal pain a little better.  NPO and feels hunger.    Review of Systems   Constitutional: Positive for unexpected weight change (15 pounds in 1 month). Negative for appetite change, diaphoresis and fever.   HENT: Negative for congestion and ear pain.    Eyes: Negative for pain and visual disturbance.   Respiratory: Negative for cough, chest tightness and shortness of breath.    Cardiovascular: Negative for chest pain, palpitations and leg swelling.   Gastrointestinal: Positive for abdominal pain. Negative for abdominal distention, blood in stool, constipation, diarrhea, nausea, rectal pain and vomiting.   Endocrine: Negative for cold intolerance, polydipsia, polyphagia and polyuria.   Genitourinary: Negative for difficulty urinating, discharge, dysuria and flank pain.   Musculoskeletal: Negative for arthralgias, myalgias, neck pain and neck stiffness.   Skin: Negative for rash.   Neurological: Negative for dizziness, light-headedness and headaches.   Hematological: Does not bruise/bleed easily.   Psychiatric/Behavioral: Negative for agitation and behavioral problems.     Objective:     Vital Signs (Most Recent):  Temp: 98.6 °F (37 °C) (10/03/20 0442)  Pulse: (!) 52 (10/03/20 0747)  Resp: 18 (10/03/20 0747)  BP: 127/61 (10/03/20 0747)  SpO2: 98 % (10/03/20 0747) Vital Signs (24h Range):  Temp:  [98.1 °F (36.7 °C)-98.6 °F (37 °C)] 98.6 °F (37 °C)  Pulse:  [45-76] 52  Resp:  [14-20] 18  SpO2:  [95 %-100 %] 98 %  BP: (100-175)/() 127/61     Weight: 72.6 kg (160 lb)  Body mass index is 24.33 kg/m².  No intake or output data in the 24 hours ending 10/03/20 0841   Physical Exam  Constitutional:       General: He is not in acute distress.     Appearance: He is normal weight. He is not ill-appearing.   HENT:      Head: Normocephalic and atraumatic.      Right Ear: External ear normal.       Left Ear: External ear normal.      Nose: Nose normal.      Mouth/Throat:      Mouth: Mucous membranes are moist.      Pharynx: Oropharynx is clear.   Eyes:      General: No scleral icterus.     Conjunctiva/sclera: Conjunctivae normal.   Neck:      Musculoskeletal: Normal range of motion and neck supple.   Cardiovascular:      Rate and Rhythm: Normal rate and regular rhythm.      Pulses: Normal pulses.      Heart sounds: Normal heart sounds. No murmur.   Pulmonary:      Effort: Pulmonary effort is normal.      Breath sounds: Normal breath sounds. No wheezing or rales.   Abdominal:      General: Abdomen is flat. Bowel sounds are normal. There is no distension.      Tenderness: There is abdominal tenderness. There is no guarding or rebound.   Musculoskeletal: Normal range of motion.      Right lower leg: No edema.      Left lower leg: No edema.   Lymphadenopathy:      Cervical: No cervical adenopathy.   Skin:     General: Skin is warm and dry.   Neurological:      General: No focal deficit present.      Mental Status: He is alert and oriented to person, place, and time.   Psychiatric:         Mood and Affect: Mood normal.         Significant Labs: All pertinent labs within the past 24 hours have been reviewed.    Significant Imaging: I have reviewed all pertinent imaging results/findings within the past 24 hours.

## 2020-10-03 NOTE — PROGRESS NOTES
"[facility] Hospital Medicine  Progress Note    Patient Name: Sidney Taylor  MRN: [id number]  Patient Class: OP- Observation   Admission Date: 10/2/2020  Length of Stay: 0 days  Attending Physician: Slim Sanford MD  Primary Care Provider: Samuel Santa MD        Subjective:     Principal Problem:Intractable nausea and vomiting        HPI:  Per ED:  "Afebrile 60-year-old male with PMH of hypertension diabetes mellitus presents the ED for evaluation of generalized abdominal pain with associated nausea and vomiting.  Patient reports that the abdominal pain is located to the generalized abdomen.  Does report few episodes of loose stool.  He denies any blood in either emesis or stool.  He does report similar episode 1 year ago was told probable viral etiology.  He has not tried any medications for the symptoms.  Does report some increased alcohol use, denies any tobacco use does report occasional marijuana use.  Denies any recent travel, sick contacts or suspected food contamination"    Patient is a 60 year old male with a PMH significant for T2D and HTN who presented with complaints of abdominal pain with N/V.  Patient states he started with nausea yesterday morning and then developed generalized abdominal pain last night with one episode of emesis - non-bloody.  No radiation of pain to back.  No ETOH use.  He does smoke marijuana daily.  He had recurrent pain with n/v x 2 this morning, then came to ED.  He had loose stool this morning - no melena or hematochezia.  He admits to a 15 pound weight loss in past month.  Poor appetite with sour taste in mouth.  No headache or neck pain.  No cough or SOB.  No NS.  He admits to hot and cold spells.  No dysuria.  Currently with no abdominal pain.    Overview/Hospital Course:  Patient is a 60 year old male with a PMH significant for T2D and HTN who presented with complaints of abdominal pain with N/V.  Patient states he started with nausea yesterday morning " and then developed generalized abdominal pain last night with one episode of emesis.  CT A/P showed non rotation of the bowel with small bowel loops on the right and much of the colon on the left.  Labs were unremarkable, including normal Lipase.  Patient was made NPO and Surgery consulted.    Interval History: Patient overall feeling better.  No emesis since leaving ED.  No nausea this morning.  No further diarrhea.  Abdominal pain a little better.  NPO and feels hunger.    Review of Systems   Constitutional: Positive for unexpected weight change (15 pounds in 1 month). Negative for appetite change, diaphoresis and fever.   HENT: Negative for congestion and ear pain.    Eyes: Negative for pain and visual disturbance.   Respiratory: Negative for cough, chest tightness and shortness of breath.    Cardiovascular: Negative for chest pain, palpitations and leg swelling.   Gastrointestinal: Positive for abdominal pain. Negative for abdominal distention, blood in stool, constipation, diarrhea, nausea, rectal pain and vomiting.   Endocrine: Negative for cold intolerance, polydipsia, polyphagia and polyuria.   Genitourinary: Negative for difficulty urinating, discharge, dysuria and flank pain.   Musculoskeletal: Negative for arthralgias, myalgias, neck pain and neck stiffness.   Skin: Negative for rash.   Neurological: Negative for dizziness, light-headedness and headaches.   Hematological: Does not bruise/bleed easily.   Psychiatric/Behavioral: Negative for agitation and behavioral problems.     Objective:     Vital Signs (Most Recent):  Temp: 98.6 °F (37 °C) (10/03/20 0442)  Pulse: (!) 52 (10/03/20 0747)  Resp: 18 (10/03/20 0747)  BP: 127/61 (10/03/20 0747)  SpO2: 98 % (10/03/20 0747) Vital Signs (24h Range):  Temp:  [98.1 °F (36.7 °C)-98.6 °F (37 °C)] 98.6 °F (37 °C)  Pulse:  [45-76] 52  Resp:  [14-20] 18  SpO2:  [95 %-100 %] 98 %  BP: (100-175)/() 127/61     Weight: 72.6 kg (160 lb)  Body mass index is 24.33  kg/m².  No intake or output data in the 24 hours ending 10/03/20 0841   Physical Exam  Constitutional:       General: He is not in acute distress.     Appearance: He is normal weight. He is not ill-appearing.   HENT:      Head: Normocephalic and atraumatic.      Right Ear: External ear normal.      Left Ear: External ear normal.      Nose: Nose normal.      Mouth/Throat:      Mouth: Mucous membranes are moist.      Pharynx: Oropharynx is clear.   Eyes:      General: No scleral icterus.     Conjunctiva/sclera: Conjunctivae normal.   Neck:      Musculoskeletal: Normal range of motion and neck supple.   Cardiovascular:      Rate and Rhythm: Normal rate and regular rhythm.      Pulses: Normal pulses.      Heart sounds: Normal heart sounds. No murmur.   Pulmonary:      Effort: Pulmonary effort is normal.      Breath sounds: Normal breath sounds. No wheezing or rales.   Abdominal:      General: Abdomen is flat. Bowel sounds are normal. There is no distension.      Tenderness: There is abdominal tenderness. There is no guarding or rebound.   Musculoskeletal: Normal range of motion.      Right lower leg: No edema.      Left lower leg: No edema.   Lymphadenopathy:      Cervical: No cervical adenopathy.   Skin:     General: Skin is warm and dry.   Neurological:      General: No focal deficit present.      Mental Status: He is alert and oriented to person, place, and time.   Psychiatric:         Mood and Affect: Mood normal.         Significant Labs: All pertinent labs within the past 24 hours have been reviewed.    Significant Imaging: I have reviewed all pertinent imaging results/findings within the past 24 hours.      Assessment/Plan:      * Intractable nausea and vomiting  Started 1 day PTA.  Imaging below with nonrotation of bowel.  Also daily marijuana use, so cannabinoid hyperemesis is a concern as well.   Feeling a little better this morning.    CT A/P with - Suspect non rotation of the bowel with small bowel loops on  the right and much of the colon on the left.  I do not see the appendix, though I see no significant inflammatory stranding or free fluid.  If symptoms persist or worsen, consider repeat imaging.    Labs:  WBC normal.    Lipase normal  COVID screen negative  Tox positive for THC  HIV negative  HCV negative    Plan:  Surgery Consult - awaiting recommendations  Anti-emetics and PPI  Clear Liquid diet  IV fluids for now          Weight loss  15 pounds in past month per patient.  Unclear if related to above  No cervical LAD  HIV 1/2 and HCV negative  TSH normal  LDH mildly elevated  -Outpatient work-up if continues      Essential hypertension, benign  Previously on Losartan 25mg, but d/c by his PCP  BP normal  -Follow for now      Controlled type 2 diabetes mellitus with microalbuminuria, without long-term current use of insulin  Previously on Glipizide - was d/c by his PCP in past.  A1C in 3/2019 was 6.1  Repeat A1C was 5.6.  -POCT Glucose qAC and HS with low dose correction SSI        VTE Risk Mitigation (From admission, onward)         Ordered     Place GABRIELE hose  Until discontinued      10/02/20 1733     Place sequential compression device  Until discontinued      10/02/20 1733     IP VTE LOW RISK PATIENT  Once      10/02/20 1733                Discharge Planning   JOURDAN:      Code Status: Full Code   Is the patient medically ready for discharge?:     Reason for patient still in hospital (select all that apply): Patient trending condition, Treatment and Consult recommendations                     Slim Sanford MD  Department of Hospital Medicine   [facility]

## 2020-10-03 NOTE — ASSESSMENT & PLAN NOTE
Previously on Glipizide - was d/c by his PCP in past.  A1C in 3/2019 was 6.1  Repeat A1C was 5.6.  -POCT Glucose qAC and HS with low dose correction SSI

## 2020-10-04 VITALS
RESPIRATION RATE: 20 BRPM | TEMPERATURE: 99 F | SYSTOLIC BLOOD PRESSURE: 154 MMHG | BODY MASS INDEX: 24.25 KG/M2 | WEIGHT: 160 LBS | HEART RATE: 65 BPM | DIASTOLIC BLOOD PRESSURE: 78 MMHG | OXYGEN SATURATION: 96 % | HEIGHT: 68 IN

## 2020-10-04 PROBLEM — F12.10 MARIJUANA ABUSE: Status: ACTIVE | Noted: 2020-10-04

## 2020-10-04 LAB
ANION GAP SERPL CALC-SCNC: 10 MMOL/L (ref 8–16)
BASOPHILS # BLD AUTO: 0.02 K/UL (ref 0–0.2)
BASOPHILS NFR BLD: 0.3 % (ref 0–1.9)
BUN SERPL-MCNC: 13 MG/DL (ref 6–20)
CALCIUM SERPL-MCNC: 8.8 MG/DL (ref 8.7–10.5)
CHLORIDE SERPL-SCNC: 105 MMOL/L (ref 95–110)
CO2 SERPL-SCNC: 24 MMOL/L (ref 23–29)
CREAT SERPL-MCNC: 1 MG/DL (ref 0.5–1.4)
DIFFERENTIAL METHOD: ABNORMAL
EOSINOPHIL # BLD AUTO: 0 K/UL (ref 0–0.5)
EOSINOPHIL NFR BLD: 0.1 % (ref 0–8)
ERYTHROCYTE [DISTWIDTH] IN BLOOD BY AUTOMATED COUNT: 12.4 % (ref 11.5–14.5)
EST. GFR  (AFRICAN AMERICAN): >60 ML/MIN/1.73 M^2
EST. GFR  (NON AFRICAN AMERICAN): >60 ML/MIN/1.73 M^2
GLUCOSE SERPL-MCNC: 83 MG/DL (ref 70–110)
HCT VFR BLD AUTO: 37.7 % (ref 40–54)
HGB BLD-MCNC: 12.7 G/DL (ref 14–18)
IMM GRANULOCYTES # BLD AUTO: 0.03 K/UL (ref 0–0.04)
IMM GRANULOCYTES NFR BLD AUTO: 0.4 % (ref 0–0.5)
LYMPHOCYTES # BLD AUTO: 2.3 K/UL (ref 1–4.8)
LYMPHOCYTES NFR BLD: 30.7 % (ref 18–48)
MAGNESIUM SERPL-MCNC: 1.6 MG/DL (ref 1.6–2.6)
MCH RBC QN AUTO: 31.1 PG (ref 27–31)
MCHC RBC AUTO-ENTMCNC: 33.7 G/DL (ref 32–36)
MCV RBC AUTO: 92 FL (ref 82–98)
MONOCYTES # BLD AUTO: 0.7 K/UL (ref 0.3–1)
MONOCYTES NFR BLD: 10.1 % (ref 4–15)
NEUTROPHILS # BLD AUTO: 4.3 K/UL (ref 1.8–7.7)
NEUTROPHILS NFR BLD: 58.4 % (ref 38–73)
NRBC BLD-RTO: 0 /100 WBC
PHOSPHATE SERPL-MCNC: 3.1 MG/DL (ref 2.7–4.5)
PLATELET # BLD AUTO: 132 K/UL (ref 150–350)
PMV BLD AUTO: 12.4 FL (ref 9.2–12.9)
POCT GLUCOSE: 89 MG/DL (ref 70–110)
POTASSIUM SERPL-SCNC: 3.7 MMOL/L (ref 3.5–5.1)
RBC # BLD AUTO: 4.09 M/UL (ref 4.6–6.2)
SODIUM SERPL-SCNC: 139 MMOL/L (ref 136–145)
WBC # BLD AUTO: 7.33 K/UL (ref 3.9–12.7)

## 2020-10-04 PROCEDURE — 99226 PR SUBSEQUENT OBSERVATION CARE,LEVEL III: CPT | Mod: ,,, | Performed by: INTERNAL MEDICINE

## 2020-10-04 PROCEDURE — G0378 HOSPITAL OBSERVATION PER HR: HCPCS

## 2020-10-04 PROCEDURE — 83735 ASSAY OF MAGNESIUM: CPT

## 2020-10-04 PROCEDURE — 85025 COMPLETE CBC W/AUTO DIFF WBC: CPT

## 2020-10-04 PROCEDURE — 96361 HYDRATE IV INFUSION ADD-ON: CPT

## 2020-10-04 PROCEDURE — 84100 ASSAY OF PHOSPHORUS: CPT

## 2020-10-04 PROCEDURE — 36415 COLL VENOUS BLD VENIPUNCTURE: CPT

## 2020-10-04 PROCEDURE — 96376 TX/PRO/DX INJ SAME DRUG ADON: CPT

## 2020-10-04 PROCEDURE — 63600175 PHARM REV CODE 636 W HCPCS: Performed by: INTERNAL MEDICINE

## 2020-10-04 PROCEDURE — 25000003 PHARM REV CODE 250: Performed by: INTERNAL MEDICINE

## 2020-10-04 PROCEDURE — 80048 BASIC METABOLIC PNL TOTAL CA: CPT

## 2020-10-04 PROCEDURE — 99226 PR SUBSEQUENT OBSERVATION CARE,LEVEL III: ICD-10-PCS | Mod: ,,, | Performed by: INTERNAL MEDICINE

## 2020-10-04 PROCEDURE — 94761 N-INVAS EAR/PLS OXIMETRY MLT: CPT

## 2020-10-04 RX ORDER — ONDANSETRON 4 MG/1
4 TABLET, FILM COATED ORAL EVERY 6 HOURS PRN
Qty: 20 TABLET | Refills: 0 | Status: SHIPPED | OUTPATIENT
Start: 2020-10-04

## 2020-10-04 RX ADMIN — MORPHINE SULFATE 4 MG: 4 INJECTION, SOLUTION INTRAMUSCULAR; INTRAVENOUS at 12:10

## 2020-10-04 RX ADMIN — SODIUM CHLORIDE, SODIUM LACTATE, POTASSIUM CHLORIDE, AND CALCIUM CHLORIDE: .6; .31; .03; .02 INJECTION, SOLUTION INTRAVENOUS at 02:10

## 2020-10-04 RX ADMIN — PANTOPRAZOLE SODIUM 40 MG: 40 TABLET, DELAYED RELEASE ORAL at 09:10

## 2020-10-04 NOTE — PROGRESS NOTES
All discharge instructions reviewed with patient. All questions answered and verbalized understanding. Peripheral IV's removed per order prior to discharge, patient tolerated well. Patient transported off unit via WC, to be driven home by family. Safety precautions maintained. Surgical mask in place per policy.

## 2020-10-04 NOTE — ASSESSMENT & PLAN NOTE
"Patient uses "a lot" of marijuana daily.  He developed anxiety and chills yesterday.  Suspect some withdrawal symptoms.  Discussed need to taper use  - advised to discuss with PCP on discharge    "

## 2020-10-04 NOTE — PLAN OF CARE
AAOx4, RA. 22G RH infusing LR. Full liquid diet maintained. BG monitored. Tele monitor maintain SB lowest 52. PRN meds given for abdominal pain. No NVD noted.     *Purposeful hourly rounding done. POC reviewed with pt. No falls or injuries on shift. Bed alarm on. Bed low and locked. Call light in reach. Will continue to monitor.

## 2020-10-04 NOTE — DISCHARGE SUMMARY
"[facility] Hospital Medicine  Discharge Summary      Patient Name: Sidney Taylor  MRN: [id number]  Admission Date: 10/2/2020  Hospital Length of Stay: 0 days  Discharge Date and Time:  10/04/2020 8:16 AM  Attending Physician: Slim Sanford MD   Discharging Provider: Slim Sanford MD  Primary Care Provider: Samuel Santa MD      HPI:   Per ED:  "Afebrile 60-year-old male with PMH of hypertension diabetes mellitus presents the ED for evaluation of generalized abdominal pain with associated nausea and vomiting.  Patient reports that the abdominal pain is located to the generalized abdomen.  Does report few episodes of loose stool.  He denies any blood in either emesis or stool.  He does report similar episode 1 year ago was told probable viral etiology.  He has not tried any medications for the symptoms.  Does report some increased alcohol use, denies any tobacco use does report occasional marijuana use.  Denies any recent travel, sick contacts or suspected food contamination"    Patient is a 60 year old male with a PMH significant for T2D and HTN who presented with complaints of abdominal pain with N/V.  Patient states he started with nausea yesterday morning and then developed generalized abdominal pain last night with one episode of emesis - non-bloody.  No radiation of pain to back.  No ETOH use.  He does smoke marijuana daily.  He had recurrent pain with n/v x 2 this morning, then came to ED.  He had loose stool this morning - no melena or hematochezia.  He admits to a 15 pound weight loss in past month.  Poor appetite with sour taste in mouth.  No headache or neck pain.  No cough or SOB.  No NS.  He admits to hot and cold spells.  No dysuria.  Currently with no abdominal pain.    * No surgery found *      Hospital Course:   Patient is a 60 year old male with a PMH significant for T2D and HTN who presented with complaints of abdominal pain with N/V.  Patient states he started with nausea " "yesterday morning and then developed generalized abdominal pain last night with one episode of emesis.  CT A/P showed non rotation of the bowel with small bowel loops on the right and much of the colon on the left.  Labs were unremarkable, including normal Lipase.  Patient was made NPO and Surgery consulted.  See below     Consults:   Consults (From admission, onward)        Status Ordering Provider     Inpatient consult to General Surgery  Once     Provider:  Dex Torres MD    Completed CORY AGUILAR.          * Intractable nausea and vomiting  Possible Ileus.  Started 1 day PTA.  Imaging below with nonrotation of bowel.  Also daily marijuana use, so cannabinoid hyperemesis is a concern as well.   Feeling better this morning.  Has appetite.  Tolerated Full Liquids and wants regular diet.  Abdomen soft and + BS.  Having Flatus    CT A/P with - Suspect non rotation of the bowel with small bowel loops on the right and much of the colon on the left.  I do not see the appendix, though I see no significant inflammatory stranding or free fluid.  If symptoms persist or worsen, consider repeat imaging.    Labs:  WBC normal.    Lipase normal  COVID screen negative  Tox positive for THC  HIV negative  HCV negative    Plan:  Regular diet this morning  Anti-emetics prn  Discharge today if he tolerated diet  Follow up with PCP.          Marijuana abuse  Patient uses "a lot" of marijuana daily.  He developed anxiety and chills yesterday.  Suspect some withdrawal symptoms.  Discussed need to taper use  - advised to discuss with PCP on discharge      Weight loss  15 pounds in past month per patient.  Unclear if related to above  No cervical LAD  HIV 1/2 and HCV negative  TSH normal  LDH mildly elevated  -Outpatient work-up if continues      Essential hypertension, benign  Previously on Losartan 25mg, but d/c by his PCP  BP normal        Controlled type 2 diabetes mellitus with microalbuminuria, without long-term current " use of insulin  Previously on Glipizide - was d/c by his PCP in past.  A1C in 3/2019 was 6.1  Repeat A1C was 5.6.  Follow up with PCP        Final Active Diagnoses:    Diagnosis Date Noted POA    PRINCIPAL PROBLEM:  Intractable nausea and vomiting [R11.2] 10/02/2020 Yes    Marijuana abuse [F12.10] 10/04/2020 Yes    Weight loss [R63.4] 10/02/2020 Yes    Controlled type 2 diabetes mellitus with microalbuminuria, without long-term current use of insulin [E11.29, R80.9] 11/27/2018 Yes    Essential hypertension, benign [I10] 11/27/2018 Yes      Problems Resolved During this Admission:       Discharged Condition: fair    Disposition: Home    Follow Up:  Follow-up Information     Samuel Santa MD In 1 week.    Specialty: Family Medicine  Contact information:  [address] [id number] [phone]                 Patient Instructions:   No discharge procedures on file.    Significant Diagnostic Studies: Labs:   BMP:   Recent Labs   Lab 10/02/20  1021 10/03/20  0501 10/04/20  0457   * 94 83    139 139   K 3.7 3.9 3.7   CL 99 106 105   CO2 25 24 24   BUN 11 13 13   CREATININE 1.2 1.0 1.0   CALCIUM 10.4 8.9 8.8   MG  --  1.8 1.6    and CBC   Recent Labs   Lab 10/02/20  1021 10/03/20  0501 10/04/20  0457   WBC 8.27 8.33 7.33   HGB 15.3 13.0* 12.7*   HCT 46.2 40.0 37.7*    142* 132*       Pending Diagnostic Studies:     None         Medications:  Reconciled Home Medications:      Medication List      START taking these medications    ondansetron 4 MG tablet  Commonly known as: ZOFRAN  Take 1 tablet (4 mg total) by mouth every 6 (six) hours as needed for Nausea.        STOP taking these medications    blood sugar diagnostic Strp     blood-glucose meter kit     glipiZIDE 2.5 MG Tr24  Commonly known as: GLUCOTROL     lancets Misc     losartan 25 MG tablet  Commonly known as: COZAAR     sildenafil 20 mg Tab  Commonly known as: REVATIO            Indwelling Lines/Drains at time of discharge:    Lines/Drains/Airways     None                 Time spent on the discharge of patient: 35 minutes  Patient was seen and examined on the date of discharge and determined to be suitable for discharge.         Slim Sanford MD  Department of Hospital Medicine  [facility]

## 2020-10-04 NOTE — ASSESSMENT & PLAN NOTE
Previously on Glipizide - was d/c by his PCP in past.  A1C in 3/2019 was 6.1  Repeat A1C was 5.6.  Follow up with PCP

## 2020-10-04 NOTE — ASSESSMENT & PLAN NOTE
Possible Ileus.  Started 1 day PTA.  Imaging below with nonrotation of bowel.  Also daily marijuana use, so cannabinoid hyperemesis is a concern as well.   Feeling better this morning.  Has appetite.  Tolerated Full Liquids and wants regular diet.  Abdomen soft and + BS.  Having Flatus    CT A/P with - Suspect non rotation of the bowel with small bowel loops on the right and much of the colon on the left.  I do not see the appendix, though I see no significant inflammatory stranding or free fluid.  If symptoms persist or worsen, consider repeat imaging.    Labs:  WBC normal.    Lipase normal  COVID screen negative  Tox positive for THC  HIV negative  HCV negative    Plan:  Regular diet this morning  Anti-emetics prn  Discharge today if he tolerated diet  Follow up with PCP.

## 2020-10-04 NOTE — PLAN OF CARE
Final Note  Patient is discharged home with self-care.  Patients family will transport home.  No further DC needs from  perspective.       10/04/20 0925   Final Note   Assessment Type Final Discharge Note   Anticipated Discharge Disposition Home   What phone number can be called within the next 1-3 days to see how you are doing after discharge? [phone]   Discharge plans and expectations educations in teach back method with documentation complete? Yes   Post-Acute Status   Discharge Delays None known at this time

## 2020-10-04 NOTE — DISCHARGE INSTRUCTIONS
Ileus     The digestive tract.   Ileus occurs when there is a problem with motility in the stomach and small or large intestine (bowel). Motility is the movement of food and waste through the digestive tract. Ileus is not caused by a physical blockage (obstruction).    Normally, muscles in the bowel walls contract to move waste along. Signals from nerves tell the muscles when to contract. With ileus, this movement slows down or stops completely. As a result, waste cant move through the bowels and out of the body. This can cause belly (abdominal) pain and other symptoms. Treatment is needed to restore normal movement and ease symptoms.  Causes of ileus  Ileus can be caused by the following:  · Abdominal surgery  · Abdominal infection  · Injury to blood vessels that supply blood to the abdomen  · Low levels of sodium or potassium (electrolyte imbalance)  · Certain medicines, such as opioid pain medicines  · Certain kidney or lung diseases  · Certain health problems, such as cystic fibrosis and diabetes   Symptoms of ileus  Common symptoms of ileus include:  · Belly swelling or bloating  · Upset stomach (nausea) and vomiting  · Belly cramps  · Constipation or diarrhea   · Loss of appetite  · Not able to keep food down  · Not able to pass stool or gas  Diagnosing ileus  Your healthcare provider will ask about your symptoms and health history. Youll also have a physical exam. If your provider thinks you may have ileus, tests may be done to confirm the problem. These can include:  · Imaging tests. These provide pictures of the bowels. Common tests include X-rays and a CT scan.  · Blood tests. These are done to c heck for infection and other problems, such as fluid loss (dehydration).  · Upper GI (gastrointestinal) series. This test takes X-rays of the upper digestive tract, from the mouth to the small intestine. An X-ray dye (contrast fluid) is used. The dye coats the inside of the upper digestive tract so that it  will show up clearly on X-rays.  Treating ileus  In most cases, ileus goes away by itself when the main cause clears up. The goal is to manage symptoms until movement in the digestive tract returns to normal. Treatment takes place in a hospital. As part of your care, the following may be done:  · No food or drink is given by mouth. This allows your bowels to rest.  · An IV (intravenous) line is placed in a vein in your arm or hand. The IV line is used to give fluids and nutrition. It may also be used to give medicines. These may be needed to improve movement in your digestive tract, or to ease pain. They may also be needed to treat any underlying infections or conditions you have.  · A soft, thin, flexible tube (nasogastric tube) is inserted through your nose and into your stomach. The tube is used to remove extra gas and fluid in your stomach and bowels. This helps to ease symptoms such as pain and swelling.  · Youll be watched closely in the hospital until your symptoms get better. Your provider will tell you when youre well enough to go home. This is usually within a few days.  · In rare cases, problems may occur. Other treatments, such as surgery, may then be done. Your provider will tell you more about other treatments, if needed.  Long-term concerns   After treatment, most people recover completely. In some cases, you may need to see your provider for a follow-up appointment.  When to call your provider  Call your healthcare provider right away if you have any of the following:  · Fever of 100.4°F (38°C) or higher, or as advised by your provider  · Belly swelling or pain that wont go away  · Not able to pass stool or gas  · Nausea and vomiting  · Getting full very easily with only small amounts of food or drink  · Bleeding from the rectum  · Black, tarry stool   Date Last Reviewed: 7/1/2016  © 7208-9916 [employer]. [address] [place] [address], [place] [address]. All rights reserved. This  information is not intended as a substitute for professional medical care. Always follow your healthcare professional's instructions.          Marijuana Abuse  Marijuana is the most widely used illegal drug in the [place]. It is called by various names such as pot, weed, blunts, grass, reefer, ganja, hash, or hashish. It is usually smoked but can be mixed with foods, or brewed as a tea. It is sometimes sold with PCP (willis dust) or amphetamine mixed in it. These drugs can cause other harmful side effects.  Marijuana can cause the following effects:  · Changes in mood (stimulated, happy, drowsy, depressed, paranoid)  · Hallucinations  · Increased heart rate and blood pressure  · Increased appetite  · Time distortion, difficulty concentrating, impaired memory  · Lung damage (similar to cigarettes with chronic cough, wheezing, frequent colds, and bronchitis)  · Decreased sperm count  · Dizziness, vertigo  You can become psychologically dependent on marijuana. That means the craving to use the drug is emotional or psychological rather than due to physical withdrawal.  Is marijuana running your life?  Here are some of the signs:  · Relying on marijuana to feel good, forget problems, deal with stress or to relax  · Wanting to be alone most of the time or only with others who use drugs  · Losing interest in things that used to be important  · Changes in school or job performance or attendance  · Spending a lot of time thinking about how to get marijuana  · Stealing or selling your things so you can buy marijuana  · Unable to stop using even though you may want to quit  · Increasing anxiety, anger, or depression  · Sleeping too much, changes in eating habits (weight loss or gain)  · Needing to use more to get the same effect  Home care  The following suggestions will help you manage marijuana abuse:  · Once you have become addicted to any drug, quitting is hard to do. Most people find they can't quit without help. So,  dont try to do this alone. Talk to someone you trust who can support you. Seek professional help.  · Avoid people and places where drugs are used. That only increases the temptation to use.  Follow-up care  Follow up with your healthcare provider, or as advised.  For more information or a referral to a treatment center in your area, contact:  · Your local mental health center or the National Alcohol and Substance Abuse Information Center (112)-070-1692  www.addictioncareOrthomimetics.com  · National [place] on Alcoholism and Drug Dependence 988-176-6701  www.ncadd.org  · Marijuana Anonymous 111-442-7452  www.marijuana-anonymous.org  When to seek medical advice   Call your healthcare provider right away if any of these occur:  · You feel extreme depression, fear, anxiety, or anger toward yourself or others.  · You feel out of control.  · You feel that you may try to harm yourself or another.  · You experience chest pain or shortness of breath.  Date Last Reviewed: 6/1/2016  © 9431-8428 [employer]. [address] [place] [address], [place] [address]. All rights reserved. This information is not intended as a substitute for professional medical care. Always follow your healthcare professional's instructions.

## 2020-10-05 ENCOUNTER — HOSPITAL ENCOUNTER (EMERGENCY)
Facility: OTHER | Age: 60
Discharge: HOME | End: 2020-10-05
Attending: EMERGENCY MEDICINE
Payer: MEDICARE

## 2020-10-05 VITALS
WEIGHT: 154 LBS | OXYGEN SATURATION: 99 % | HEART RATE: 52 BPM | BODY MASS INDEX: 23.34 KG/M2 | HEIGHT: 68 IN | RESPIRATION RATE: 16 BRPM | DIASTOLIC BLOOD PRESSURE: 80 MMHG | SYSTOLIC BLOOD PRESSURE: 161 MMHG | TEMPERATURE: 99 F

## 2020-10-05 DIAGNOSIS — R03.0 ELEVATED BLOOD PRESSURE READING: ICD-10-CM

## 2020-10-05 DIAGNOSIS — F12.90 CANNABINOID HYPEREMESIS SYNDROME: Primary | ICD-10-CM

## 2020-10-05 DIAGNOSIS — R11.2 CANNABINOID HYPEREMESIS SYNDROME: Primary | ICD-10-CM

## 2020-10-05 DIAGNOSIS — R00.1 BRADYCARDIA: ICD-10-CM

## 2020-10-05 LAB
ALBUMIN SERPL BCP-MCNC: 4.6 G/DL (ref 3.5–5.2)
ALP SERPL-CCNC: 56 U/L (ref 55–135)
ALT SERPL W/O P-5'-P-CCNC: 31 U/L (ref 10–44)
ANION GAP SERPL CALC-SCNC: 14 MMOL/L (ref 8–16)
AST SERPL-CCNC: 28 U/L (ref 10–40)
BASOPHILS # BLD AUTO: 0.02 K/UL (ref 0–0.2)
BASOPHILS NFR BLD: 0.3 % (ref 0–1.9)
BILIRUB SERPL-MCNC: 0.8 MG/DL (ref 0.1–1)
BUN SERPL-MCNC: 9 MG/DL (ref 6–20)
CALCIUM SERPL-MCNC: 9.4 MG/DL (ref 8.7–10.5)
CHLORIDE SERPL-SCNC: 98 MMOL/L (ref 95–110)
CO2 SERPL-SCNC: 23 MMOL/L (ref 23–29)
CREAT SERPL-MCNC: 1 MG/DL (ref 0.5–1.4)
DIFFERENTIAL METHOD: ABNORMAL
EOSINOPHIL # BLD AUTO: 0 K/UL (ref 0–0.5)
EOSINOPHIL NFR BLD: 0.1 % (ref 0–8)
ERYTHROCYTE [DISTWIDTH] IN BLOOD BY AUTOMATED COUNT: 12.2 % (ref 11.5–14.5)
EST. GFR  (AFRICAN AMERICAN): >60 ML/MIN/1.73 M^2
EST. GFR  (NON AFRICAN AMERICAN): >60 ML/MIN/1.73 M^2
GLUCOSE SERPL-MCNC: 185 MG/DL (ref 70–110)
HCT VFR BLD AUTO: 42.6 % (ref 40–54)
HGB BLD-MCNC: 14.3 G/DL (ref 14–18)
IMM GRANULOCYTES # BLD AUTO: 0.02 K/UL (ref 0–0.04)
IMM GRANULOCYTES NFR BLD AUTO: 0.3 % (ref 0–0.5)
LACTATE SERPL-SCNC: 2.2 MMOL/L (ref 0.5–2.2)
LIPASE SERPL-CCNC: 8 U/L (ref 4–60)
LYMPHOCYTES # BLD AUTO: 1.1 K/UL (ref 1–4.8)
LYMPHOCYTES NFR BLD: 16.3 % (ref 18–48)
MCH RBC QN AUTO: 30.8 PG (ref 27–31)
MCHC RBC AUTO-ENTMCNC: 33.6 G/DL (ref 32–36)
MCV RBC AUTO: 92 FL (ref 82–98)
MONOCYTES # BLD AUTO: 0.5 K/UL (ref 0.3–1)
MONOCYTES NFR BLD: 8.1 % (ref 4–15)
NEUTROPHILS # BLD AUTO: 5 K/UL (ref 1.8–7.7)
NEUTROPHILS NFR BLD: 74.9 % (ref 38–73)
NRBC BLD-RTO: 0 /100 WBC
PLATELET # BLD AUTO: 145 K/UL (ref 150–350)
PMV BLD AUTO: 12.2 FL (ref 9.2–12.9)
POCT GLUCOSE: 172 MG/DL (ref 70–110)
POTASSIUM SERPL-SCNC: 4.1 MMOL/L (ref 3.5–5.1)
PROT SERPL-MCNC: 7.9 G/DL (ref 6–8.4)
RBC # BLD AUTO: 4.64 M/UL (ref 4.6–6.2)
SODIUM SERPL-SCNC: 135 MMOL/L (ref 136–145)
WBC # BLD AUTO: 6.7 K/UL (ref 3.9–12.7)

## 2020-10-05 PROCEDURE — 82962 GLUCOSE BLOOD TEST: CPT

## 2020-10-05 PROCEDURE — 83605 ASSAY OF LACTIC ACID: CPT

## 2020-10-05 PROCEDURE — 83690 ASSAY OF LIPASE: CPT

## 2020-10-05 PROCEDURE — 99285 EMERGENCY DEPT VISIT HI MDM: CPT | Mod: 25

## 2020-10-05 PROCEDURE — 93010 EKG 12-LEAD: ICD-10-PCS | Mod: ,,, | Performed by: INTERNAL MEDICINE

## 2020-10-05 PROCEDURE — 96375 TX/PRO/DX INJ NEW DRUG ADDON: CPT

## 2020-10-05 PROCEDURE — 96374 THER/PROPH/DIAG INJ IV PUSH: CPT

## 2020-10-05 PROCEDURE — 63600175 PHARM REV CODE 636 W HCPCS: Performed by: PHYSICIAN ASSISTANT

## 2020-10-05 PROCEDURE — 93010 ELECTROCARDIOGRAM REPORT: CPT | Mod: ,,, | Performed by: INTERNAL MEDICINE

## 2020-10-05 PROCEDURE — 85025 COMPLETE CBC W/AUTO DIFF WBC: CPT

## 2020-10-05 PROCEDURE — 80053 COMPREHEN METABOLIC PANEL: CPT

## 2020-10-05 PROCEDURE — 93005 ELECTROCARDIOGRAM TRACING: CPT

## 2020-10-05 RX ORDER — KETOROLAC TROMETHAMINE 30 MG/ML
15 INJECTION, SOLUTION INTRAMUSCULAR; INTRAVENOUS
Status: COMPLETED | OUTPATIENT
Start: 2020-10-05 | End: 2020-10-05

## 2020-10-05 RX ORDER — DICYCLOMINE HYDROCHLORIDE 20 MG/1
20 TABLET ORAL EVERY 12 HOURS PRN
Qty: 6 TABLET | Refills: 0 | Status: SHIPPED | OUTPATIENT
Start: 2020-10-05

## 2020-10-05 RX ORDER — HALOPERIDOL 5 MG/ML
5 INJECTION INTRAMUSCULAR
Status: COMPLETED | OUTPATIENT
Start: 2020-10-05 | End: 2020-10-05

## 2020-10-05 RX ORDER — CAPSAICIN 0.03 G/100G
CREAM TOPICAL
Qty: 45 G | Refills: 0 | Status: SHIPPED | OUTPATIENT
Start: 2020-10-05

## 2020-10-05 RX ADMIN — KETOROLAC TROMETHAMINE 15 MG: 30 INJECTION, SOLUTION INTRAMUSCULAR at 10:10

## 2020-10-05 RX ADMIN — HALOPERIDOL LACTATE 5 MG: 5 INJECTION, SOLUTION INTRAMUSCULAR at 10:10

## 2020-10-05 NOTE — ED NOTES
Pt reporting he was recently discharged from hospital for bowel obstruction, reporting worsening pain x this AM with nausea, vomiting, no active vomiting at present. Pt AAOx4 and appropriate at this time. Respirations even and unlabored. No acute distress noted.   Awaiting further orders. Pt updated on POC. Bed is locked and in lowest position with side rails up x2. Call bell within reach and pt oriented to use of call bell. Pt placed on continuous cardiac monitoring, continuous pulse ox, and continuous BP cuff. Will continue to monitor.

## 2020-10-05 NOTE — ED NOTES
Pt more comfortable after haldol administration. Bed is locked and in lowest position with side rails up x2. Call bell within reach. Pt on continuous cardiac monitoring, continuous pulse ox, and continuous BP cuff. Will continue to monitor.

## 2020-10-05 NOTE — ED PROVIDER NOTES
"Encounter Date: 10/5/2020       History     Chief Complaint   Patient presents with    Abdominal Pain     Pt discharged from the hospital yesterday for an obstruction. Pt reports the pain feels the same as before.      Patient is a 60-year-old male with hypertension and diabetes who presents to the emergency department by EMS with abdominal pain, nausea and vomiting.  Patient was discharged from this hospital yesterday for a possible ileus.  He states he was feeling well enough yesterday to be discharged home.  He states when he woke up this morning he was experiencing constant, sharp upper abdominal pain.  He reports mostly dry heaving.  States he did not look as emesis to tell if there was bile or blood.  Patient was seen at [facility] this morning for a follow-up appointment and was given Zofran and transfered by EMS to the ED. Patient has not had a bowel movement since discharge.  He states his pain feels similar from 2 days ago.  Patient also states is difficult to "catch my breath ".  He denies cough or fever.  He denies chest pain.  Patient admits to smoking marijuana after leaving the hospital.    The history is provided by the patient.     Review of patient's allergies indicates:   Allergen Reactions    Bactrim [sulfamethoxazole-trimethoprim] Other (See Comments)     Patient states he gets really pale     Past Medical History:   Diagnosis Date    Hypertension     Type 2 diabetes mellitus 11/2018     Past Surgical History:   Procedure Laterality Date    HAND SURGERY  2016     Family History   Problem Relation Age of Onset    Diabetes Mother     Hypertension Mother      Social History     Tobacco Use    Smoking status: Never Smoker    Smokeless tobacco: Never Used   Substance Use Topics    Alcohol use: No     Frequency: Never    Drug use: No     Review of Systems   Constitutional: Positive for chills. Negative for diaphoresis and fever.   HENT: Negative for congestion.    Eyes: Negative for redness. "   Respiratory: Positive for shortness of breath. Negative for cough.    Cardiovascular: Negative for chest pain.   Gastrointestinal: Positive for abdominal pain, constipation, nausea and vomiting. Negative for diarrhea.   Genitourinary: Negative for dysuria.   Musculoskeletal: Negative for arthralgias.   Skin: Negative for rash.   Allergic/Immunologic: Positive for immunocompromised state.   Neurological: Negative for headaches.       Physical Exam     Initial Vitals [10/05/20 1008]   BP Pulse Resp Temp SpO2   (!) 185/87 69 20 98.6 °F (37 °C) 100 %      MAP       --         Physical Exam    Constitutional: Vital signs are normal. He is cooperative. He appears distressed (Appears uncomfortable with chills).   HENT:   Head: Normocephalic and atraumatic.   Eyes: Conjunctivae and EOM are normal.   Neck: Normal range of motion. Neck supple.   Cardiovascular: Normal rate, regular rhythm and intact distal pulses.   Pulmonary/Chest: Breath sounds normal. No respiratory distress. He has no wheezes.   Abdominal: Soft. Bowel sounds are increased. There is abdominal tenderness (Upper abdomen). There is guarding.   Neurological: He is alert and oriented to person, place, and time. GCS eye subscore is 4. GCS verbal subscore is 5. GCS motor subscore is 6.   Skin: Skin is warm and dry. No rash noted.         ED Course   Procedures  Labs Reviewed   CBC W/ AUTO DIFFERENTIAL - Abnormal; Notable for the following components:       Result Value    Platelets 145 (*)     Gran% 74.9 (*)     Lymph% 16.3 (*)     All other components within normal limits   COMPREHENSIVE METABOLIC PANEL - Abnormal; Notable for the following components:    Sodium 135 (*)     Glucose 185 (*)     All other components within normal limits   POCT GLUCOSE - Abnormal; Notable for the following components:    POCT Glucose 172 (*)     All other components within normal limits   LIPASE   LACTIC ACID, PLASMA   POCT GLUCOSE MONITORING CONTINUOUS     EKG Readings:  (Independently Interpreted)   1037:  Sinus bradycardia at a rate of 55 beats per minute.  No STEMI.  Normal QT interval.  1424:  Sinus bradycardia rate of 40 beats per minute.  No STEMI.  Normal QT interval.       Imaging Results          X-Ray Abdomen Flat And Erect (Final result)  Result time 10/05/20 11:06:16    Final result by Bernardino Bush MD (10/05/20 11:06:16)                 Impression:      No acute abnormality.      Electronically signed by: Bernardino Bush MD  Date:    10/05/2020  Time:    11:06             Narrative:    EXAMINATION:  XR ABDOMEN FLAT AND ERECT    CLINICAL HISTORY:  Abdominal Pain;    TECHNIQUE:  Flat and erect AP views of the abdomen were performed.    COMPARISON:  CT October 2nd 2020    FINDINGS:  Bowel gas pattern is normal.  No pneumatosis.    Unremarkable soft tissues.                                 Medical Decision Making:   History:   Old Medical Records: I decided to obtain old medical records.  Old Records Summarized: records from previous admission(s).       <> Summary of Records: DC summary yesterday:  * Intractable nausea and vomiting  Possible Ileus.  Started 1 day PTA.  Imaging below with nonrotation of bowel.  Also daily marijuana use, so cannabinoid hyperemesis is a concern as well.   Feeling better this morning.  Has appetite.  Tolerated Full Liquids and wants regular diet.  Abdomen soft and + BS.  Having Flatus     CT A/P with - Suspect non rotation of the bowel with small bowel loops on the right and much of the colon on the left.  I do not see the appendix, though I see no significant inflammatory stranding or free fluid.  If symptoms persist or worsen, consider repeat imaging.    Initial Assessment:   Urgent evaluation of a 60 y.o. male with DM and hypertension presenting to the emergency department complaining of abdominal pain, nausea and vomiting. Patient is afebrile, nontoxic appearing and hemodynamically stable  -medical records reviewed from discharge summary  yesterday, chloride above.  Concern for possible ileus versus hyperemesis cannabis.  -abdomen soft but tender.  -will repeat lab work, imaging, provide patient with antiemetic and analgesics.  ED Management:  -patient's symptoms improved after receiving medications here in the ED. Did have a few episodes of bradycardia, EKG was normal sinus rhythm with normal QT interval.  -blood was without gross abnormalities aside from mild hyperglycemia.  -abdominal x-ray reveals no acute abnormality.  No signs of free air or obstruction.  -patient's symptoms improved.  He is able tolerate liquids and solids.  -he is educated on hyperemesis cannabis.  -will send home with anti spasmodic and capsaicin, patient was discharged with Zofran yesterday.  -patient had no other complaints today and was stable at discharge.  Other:   I have discussed this case with another health care provider.                             Clinical Impression:       ICD-10-CM ICD-9-CM   1. Cannabinoid hyperemesis syndrome  R11.2 536.2    F12.90 305.20   2. Bradycardia  R00.1 427.89   3. Elevated blood pressure reading  R03.0 796.2                                               Lowell Boland PA-C  10/05/20 [id number]

## 2020-10-05 NOTE — ED NOTES
Pt states he does not want to get up and walk and requesting something to eat in order for his stomach to feel better. PA-C aware and OK for pt to eat at this time. Pt given bony crackers. Pt AAOx4 and appropriate at this time. Respirations even and unlabored. No acute distress noted.

## 2020-10-05 NOTE — ED NOTES
Appearance: Pt awake, alert & oriented to person, place & time. Pt in no acute distress at present time. Pt is clean and well groomed with clothes appropriately fastened.   Skin: Skin warm, dry & intact. Color consistent with ethnicity. Mucous membranes moist. No breakdown or brusing noted.   Musculoskeletal: Patient moving all extremities well, no obvious swelling or deformities noted. Ambulates with steady gait.   Respiratory: Respirations spontaneous, even, and non-labored. Visible chest rise noted. Airway is open and patent. No accessory muscle use noted.   Neurologic: Sensation is intact. Speech is clear and appropriate. Eyes open spontaneously, behavior appropriate to situation, follows commands, facial expression symmetrical, purposeful motor response noted.  Cardiac: No Bilateral lower extremity edema. Cap refill is <3 seconds. Pt denies active chest pains, SOB. States some weakness.   Abdomen: Abdomen soft. Generalized abd pain. ADB non distended.   : Pt reports no dysuria or hematuria.

## 2020-10-05 NOTE — ED NOTES
Hourly rounding completed. Respirations even and unlabored. No acute distress noted. Awaiting further orders. Pt updated on POC. Bed is locked and in lowest position with side rails up x2. Call bell within reach and pt oriented to use of call bell. Pt remains on continuous cardiac monitoring, continuous pulse ox, and continuous BP cuff. Will continue to monitor.

## 2020-10-05 NOTE — ED NOTES
Rounding on pt completed. Lying in stretcher, awakens to verbal stimuli. Respirations even and unlabored. No acute distress noted. Awaiting further orders. Pt updated on POC. Bed is locked and in lowest position with side rails up x2. Call bell within reach and pt oriented to use of call bell. Pt on continuous cardiac monitoring, continuous pulse ox, and continuous BP cuff. Will continue to monitor.

## 2020-10-23 DIAGNOSIS — Z12.11 ENCOUNTER FOR SCREENING FOR MALIGNANT NEOPLASM OF COLON: Primary | ICD-10-CM

## 2025-06-16 ENCOUNTER — HOSPITAL ENCOUNTER (EMERGENCY)
Facility: HOSPITAL | Age: 65
Discharge: PSYCHIATRIC OR SUBSTANCE USE FACILITY | End: 2025-06-16
Attending: EMERGENCY MEDICINE
Payer: MEDICARE

## 2025-06-16 VITALS
HEIGHT: 66 IN | BODY MASS INDEX: 25.71 KG/M2 | OXYGEN SATURATION: 97 % | DIASTOLIC BLOOD PRESSURE: 94 MMHG | RESPIRATION RATE: 18 BRPM | HEART RATE: 92 BPM | WEIGHT: 160 LBS | SYSTOLIC BLOOD PRESSURE: 152 MMHG | TEMPERATURE: 99 F

## 2025-06-16 DIAGNOSIS — F29 PSYCHOSIS, UNSPECIFIED PSYCHOSIS TYPE: Primary | ICD-10-CM

## 2025-06-16 LAB
ABSOLUTE EOSINOPHIL (OHS): 0.02 K/UL
ABSOLUTE MONOCYTE (OHS): 0.46 K/UL (ref 0.3–1)
ABSOLUTE NEUTROPHIL COUNT (OHS): 3.11 K/UL (ref 1.8–7.7)
ALBUMIN SERPL BCP-MCNC: 4.2 G/DL (ref 3.5–5.2)
ALP SERPL-CCNC: 84 UNIT/L (ref 40–150)
ALT SERPL W/O P-5'-P-CCNC: 18 UNIT/L (ref 10–44)
AMPHET UR QL SCN: NEGATIVE
ANION GAP (OHS): 8 MMOL/L (ref 8–16)
APAP SERPL-MCNC: <3 UG/ML (ref 10–20)
AST SERPL-CCNC: 15 UNIT/L (ref 11–45)
BACTERIA #/AREA URNS AUTO: NORMAL /HPF
BARBITURATE SCN PRESENT UR: NEGATIVE
BASOPHILS # BLD AUTO: 0.03 K/UL
BASOPHILS NFR BLD AUTO: 0.6 %
BENZODIAZ UR QL SCN: NEGATIVE
BILIRUB SERPL-MCNC: 0.5 MG/DL (ref 0.1–1)
BILIRUB UR QL STRIP.AUTO: NEGATIVE
BUN SERPL-MCNC: 13 MG/DL (ref 8–23)
CALCIUM SERPL-MCNC: 9.5 MG/DL (ref 8.7–10.5)
CANNABINOIDS UR QL SCN: NEGATIVE
CHLORIDE SERPL-SCNC: 100 MMOL/L (ref 95–110)
CLARITY UR: CLEAR
CO2 SERPL-SCNC: 26 MMOL/L (ref 23–29)
COCAINE UR QL SCN: NEGATIVE
COLOR UR AUTO: COLORLESS
CREAT SERPL-MCNC: 1.2 MG/DL (ref 0.5–1.4)
CREAT UR-MCNC: 51.4 MG/DL (ref 23–375)
ERYTHROCYTE [DISTWIDTH] IN BLOOD BY AUTOMATED COUNT: 12.6 % (ref 11.5–14.5)
ETHANOL SERPL-MCNC: <10 MG/DL
GFR SERPLBLD CREATININE-BSD FMLA CKD-EPI: >60 ML/MIN/1.73/M2
GLUCOSE SERPL-MCNC: 350 MG/DL (ref 70–110)
GLUCOSE SERPL-MCNC: 357 MG/DL (ref 70–110)
GLUCOSE UR QL STRIP: ABNORMAL
HCT VFR BLD AUTO: 40.7 % (ref 40–54)
HGB BLD-MCNC: 13.1 GM/DL (ref 14–18)
HGB UR QL STRIP: NEGATIVE
HOLD SPECIMEN: NORMAL
IMM GRANULOCYTES # BLD AUTO: 0.01 K/UL (ref 0–0.04)
IMM GRANULOCYTES NFR BLD AUTO: 0.2 % (ref 0–0.5)
KETONES UR QL STRIP: NEGATIVE
LEUKOCYTE ESTERASE UR QL STRIP: NEGATIVE
LIPASE SERPL-CCNC: 18 U/L (ref 4–60)
LYMPHOCYTES # BLD AUTO: 1.49 K/UL (ref 1–4.8)
MCH RBC QN AUTO: 29.6 PG (ref 27–31)
MCHC RBC AUTO-ENTMCNC: 32.2 G/DL (ref 32–36)
MCV RBC AUTO: 92 FL (ref 82–98)
METHADONE UR QL SCN: NEGATIVE
MICROSCOPIC COMMENT: NORMAL
NITRITE UR QL STRIP: NEGATIVE
NUCLEATED RBC (/100WBC) (OHS): 0 /100 WBC
OPIATES UR QL SCN: NEGATIVE
PCP UR QL: NEGATIVE
PH UR STRIP: 6 [PH]
PLATELET # BLD AUTO: 177 K/UL (ref 150–450)
PMV BLD AUTO: 11.4 FL (ref 9.2–12.9)
POTASSIUM SERPL-SCNC: 4.1 MMOL/L (ref 3.5–5.1)
PROT SERPL-MCNC: 8.8 GM/DL (ref 6–8.4)
PROT UR QL STRIP: NEGATIVE
RBC # BLD AUTO: 4.42 M/UL (ref 4.6–6.2)
RELATIVE EOSINOPHIL (OHS): 0.4 %
RELATIVE LYMPHOCYTE (OHS): 29.1 % (ref 18–48)
RELATIVE MONOCYTE (OHS): 9 % (ref 4–15)
RELATIVE NEUTROPHIL (OHS): 60.7 % (ref 38–73)
SODIUM SERPL-SCNC: 134 MMOL/L (ref 136–145)
SP GR UR STRIP: 1.01
UROBILINOGEN UR STRIP-ACNC: NEGATIVE EU/DL
WBC # BLD AUTO: 5.12 K/UL (ref 3.9–12.7)
YEAST UR QL AUTO: NORMAL /HPF

## 2025-06-16 PROCEDURE — 99285 EMERGENCY DEPT VISIT HI MDM: CPT | Mod: 25

## 2025-06-16 PROCEDURE — 80307 DRUG TEST PRSMV CHEM ANLYZR: CPT | Performed by: EMERGENCY MEDICINE

## 2025-06-16 PROCEDURE — 85025 COMPLETE CBC W/AUTO DIFF WBC: CPT | Performed by: EMERGENCY MEDICINE

## 2025-06-16 PROCEDURE — 81003 URINALYSIS AUTO W/O SCOPE: CPT | Performed by: EMERGENCY MEDICINE

## 2025-06-16 PROCEDURE — 82077 ASSAY SPEC XCP UR&BREATH IA: CPT | Performed by: EMERGENCY MEDICINE

## 2025-06-16 PROCEDURE — 80143 DRUG ASSAY ACETAMINOPHEN: CPT | Performed by: EMERGENCY MEDICINE

## 2025-06-16 PROCEDURE — 83690 ASSAY OF LIPASE: CPT | Performed by: EMERGENCY MEDICINE

## 2025-06-16 PROCEDURE — 82962 GLUCOSE BLOOD TEST: CPT

## 2025-06-16 PROCEDURE — 84450 TRANSFERASE (AST) (SGOT): CPT | Performed by: EMERGENCY MEDICINE

## 2025-06-16 PROCEDURE — G0427 INPT/ED TELECONSULT70: HCPCS | Mod: 95,GC,, | Performed by: PSYCHIATRY & NEUROLOGY

## 2025-06-16 RX ORDER — HALOPERIDOL LACTATE 5 MG/ML
5 INJECTION, SOLUTION INTRAMUSCULAR ONCE AS NEEDED
Status: DISCONTINUED | OUTPATIENT
Start: 2025-06-16 | End: 2025-06-16 | Stop reason: HOSPADM

## 2025-06-16 RX ORDER — METFORMIN HYDROCHLORIDE 500 MG/1
500 TABLET ORAL
Qty: 30 TABLET | Refills: 0 | Status: ON HOLD | OUTPATIENT
Start: 2025-06-16 | End: 2026-06-16

## 2025-06-16 RX ORDER — LORAZEPAM 2 MG/ML
2 INJECTION INTRAMUSCULAR ONCE AS NEEDED
Status: DISCONTINUED | OUTPATIENT
Start: 2025-06-16 | End: 2025-06-16 | Stop reason: HOSPADM

## 2025-06-16 NOTE — CONSULTS
"[facility]   DEPARTMENT OF PSYCHIATRY    SERVICE: Telepsychiatry  ENCOUNTER: initial    CHIEF COMPLAINT: psychosis    TELEPSYCHIATRY (AUDIOVISUAL): Each patient who is provided psychiatric services via telehealth is: (1) informed of the relationship between the psychiatric provider and patient, as well as the respective role of any other health care staff/providers with respect to management of the patient; and (2) notified that he or she may decline to receive psychiatric services by telehealth and may withdraw from such care at any time.  Risks of telehealth include the potential for security breaches (HIPPA compliant platforms notwithstanding) and technological failure, as well as the limitations to physical examination inherent to the modality. The patient was agreeable to the use of telehealth services.    START TIME: 6/16/2025 11:10 AM  STOP TIME: 6/16/2025 12:20 PM    -- PATIENT IDENTIFIERS: Sidney Taylor  [id number]  1960  64 y.o.  male  -- REQUESTING PROVIDER: Monico Lemon MD *  -- LOCATION OF PATIENT: ED    -- MODE OF ARRIVAL: self-presented  -- PRESENT WITH PATIENT DURING SESSION: staff  -- SOURCES OF INFORMATION: PATIENT, family/friend(s)  -- LOCATION OF ENCOUNTER PROVIDER: [place], [place]    -- ENCOUNTER PROVIDER: Remberto Cox MD    History of Present Illness:    From current presentation:  "  Patient presents with    Abdominal Pain       States has people "pulling on my energy and causing my abdomen to tighten and bloat"  States family "thinks I'm crazy but witchcraft is real"    Psychiatric Evaluation       Denies SI/HI. Denies auditory or visual hallucinations. Denies psychiatric history   Sidney Taylor is a 64 y.o. male, with a PMHx of T2DM and HTN, who presents to the ED with abdominal pain x3 weeks. Patient reports that "people has been pulling on my energy and causing my abdomen to tighten and bloat." Notes that he's been eating normally but nothing goes down. Reports that his " "last BM was normal. No other exacerbating or alleviating factors. Denies taking any daily medications. Denies dysuria, hematuria, rash, fever, leg pain or other associated symptoms. Denies smoking nicotine, drinking EtOH, or any use of illicit drugs. Patient reports that he is allergic to Bactrim.  Patient will also be seen for a psychiatric evaluation. He states that there is some "spiritual entity living outside my house." Notes that he doesn't know if it is vampires or demons but states that "he might have to kill someone." Reports that he will not do it since he does not want to be in long-term.  Patient states that his family thinks "he is crazy but notes that witchcraft is real." Patient denies SI/ HI and visual or auditory hallucinations."    On interview by me today:  States, that he is under spiritual attack."I have a gift from God, I'm chosen, that's why I'm under attack".  Denies SI/HI/AH's.  Does not want to kill anybody, because he does not want to go to long-term.  No recent prescribed medication.  Denies recent alcohol/drug. Denies detox/rehab.    Clarisa Eubanks  Daughter  [phone]: not in service    Sister Addis [phone]: no answer    Sister Cha [phone]: patient might be using drugs; for at least two years has been saying strange things; receives disability[was slow learner as a child]; patient has been making poor decisions; for 2 years lived in an abandoned house until a few months ago, now lives with sister Addis; was incarcerated for 20 years until about 6 years ago; Cha feels that the patient may not be able to function adequately in the community at this time due to psychiatric problem     Psychiatric History:   Previous Psychiatric Hospitalizations: denies  Previous Medication Trials: denies  Previous Suicide Attempts: denies  History of Violence: denies  History of Depression: denies  History of Ginger: denies  History of Auditory Hallucination denies    Legal History: Past " "charges/incarcerations: denies current charge    Family Psychiatric History: denies    Social History:  Denies h/o physical or sexual abuse  Education: 11th grade  Employment Status/Finances:   Relationship Status/Sexual Orientation: has girlfriend  Children: 3 adult children  Housing Status: lives with sister, sister's daughter and sister's 2 children   history:  denies  Access to gun: denies  Hoahaoism:believes in God  Recreational activities: sports    [facility] Toolkit ASQ Suicide Screening Tool:  In the past week, have you been having thoughts about killing yourself? denies  Have you ever tried to kill yourself? denies  Are you having thoughts of killing yourself right now? denies    Psychiatric Mental Status Exam:  Arousal: alert  Sensorium/Orientation: grossly intact  Behavior/Cooperation: calm, cooperative  Speech: normal r/r/v  Language: normal use of words  Mood: " perfect"   Affect: full range, appropriate  Thought Process: goal directed  Thought Content: SI/HI  Attention/Concentration: intact for interview  Insight: appears fair  Judgment: appears fair    Past Medical History:   Past Medical History:   Diagnosis Date    Hypertension     Type 2 diabetes mellitus 11/2018      Seizures: denies  Head trauma: denies head trauma with l.o.c.    Allergies:   Review of patient's allergies indicates:   Allergen Reactions    Bactrim [sulfamethoxazole-trimethoprim] Other (See Comments)     Patient states he gets really pale     Assessment - Diagnosis - Goals:     Diagnosis/Impression:   Psychosis, unspecified, please see above collateral info obtained from sister Cha.  Today glucose 350    Based on currently available information patient may be gravely disabled.    Rec:   - medical clearance  - PEC and psychiatric hospitalization  - no standing psychotropic medication for now  - Haldol 5 mg PO/IM Q8H PRN for agitation  - follow EKG/QTc if patient receives Haldol    Plan of Care communicated to: ED " staff    Remberto Cox MD   Psychiatry  [facility]    Consults  [facility] EMERGENCY DEPARTMENT

## 2025-06-16 NOTE — ED NOTES
Nurse updated patient on tele-psych connectivity delay. Patient calm and cooperative, verbalized understanding.

## 2025-06-16 NOTE — ED NOTES
"Pt states that he has "High energy, high vibrations and there are vampires that pull on my energy and pull on my stomach when they know my energy is high." "When people are not educated on spirituality they start thinking that you are crazy."  "

## 2025-06-16 NOTE — ED PROVIDER NOTES
"Encounter Date: 6/16/2025    SCRIBE #1 NOTE: I, Bashirestherann Leydi, am scribing for, and in the presence of,  Monico Lemon MD. I have scribed the following portions of the note - the EKG reading. Other sections scribed: HPI, ROS, PE, MDM.       History     Chief Complaint   Patient presents with    Abdominal Pain     States has people "pulling on my energy and causing my abdomen to tighten and bloat"  States family "thinks I'm crazy but witchcraft is real"    Psychiatric Evaluation     Denies SI/HI. Denies auditory or visual hallucinations. Denies psychiatric history     Sidney Taylor is a 64 y.o. male, with a PMHx of T2DM and HTN, who presents to the ED with abdominal pain x3 weeks. Patient reports that "people has been pulling on my energy and causing my abdomen to tighten and bloat." Notes that he's been eating normally but nothing goes down. Reports that his last BM was normal. No other exacerbating or alleviating factors. Denies taking any daily medications. Denies dysuria, hematuria, rash, fever, leg pain or other associated symptoms. Denies smoking nicotine, drinking EtOH, or any use of illicit drugs. Patient reports that he is allergic to Bactrim.    Patient will also be seen for a psychiatric evaluation. He states that there is some "spiritual entity living outside my house." Notes that he doesn't know if it is vampires or demons but states that "he might have to kill someone." Reports that he will not do it since he does not want to be in prison.  Patient states that his family thinks "he is crazy but notes that witchcraft is real." Patient denies SI/ HI and visual or auditory hallucinations.     The history is provided by the patient. No  was used.     Review of patient's allergies indicates:   Allergen Reactions    Bactrim [sulfamethoxazole-trimethoprim] Other (See Comments)     Patient states he gets really pale     Past Medical History:   Diagnosis Date    Hypertension     Type 2 " diabetes mellitus 11/2018     Past Surgical History:   Procedure Laterality Date    HAND SURGERY  2016     Family History   Problem Relation Name Age of Onset    Diabetes Mother      Hypertension Mother       Social History[1]  Review of Systems   Constitutional:  Negative for fever.   HENT:  Negative for congestion, sore throat and trouble swallowing.    Respiratory:  Negative for cough and shortness of breath.    Cardiovascular:  Negative for chest pain.   Gastrointestinal:  Positive for abdominal pain. Negative for constipation, diarrhea, nausea and vomiting.   Genitourinary:  Negative for dysuria, flank pain, frequency and urgency.   Musculoskeletal:  Negative for back pain.   Skin:  Negative for rash.   Neurological:  Negative for headaches.   Psychiatric/Behavioral:  Negative for hallucinations and suicidal ideas.    All other systems reviewed and are negative.      Physical Exam     Initial Vitals [06/16/25 0825]   BP Pulse Resp Temp SpO2   (!) 146/82 88 20 98.2 °F (36.8 °C) 97 %      MAP       --         Physical Exam    Nursing note and vitals reviewed.  Constitutional: He appears well-developed and well-nourished.   HENT:   Head: Normocephalic and atraumatic. Mouth/Throat: Oropharynx is clear and moist.   Eyes: EOM are normal. Pupils are equal, round, and reactive to light.   Neck:   Normal range of motion.  Cardiovascular:  Normal rate and regular rhythm.           Pulmonary/Chest: Breath sounds normal. No stridor. No respiratory distress. He has no wheezes.   Abdominal: Abdomen is soft. Bowel sounds are normal. He exhibits no distension. There is no abdominal tenderness.   Musculoskeletal:         General: No tenderness or edema. Normal range of motion.      Cervical back: Normal range of motion.     Neurological: He is alert and oriented to person, place, and time. He has normal strength. GCS score is 15.   Skin: Skin is warm and dry. Capillary refill takes less than 2 seconds.   Psychiatric:    Hyperverbal.  Non logical.  Paranoid.  Hallucinating.         ED Course   Procedures  Labs Reviewed   COMPREHENSIVE METABOLIC PANEL - Abnormal       Result Value    Sodium 134 (*)     Potassium 4.1      Chloride 100      CO2 26      Glucose 357 (*)     BUN 13      Creatinine 1.2      Calcium 9.5      Protein Total 8.8 (*)     Albumin 4.2      Bilirubin Total 0.5      ALP 84      AST 15      ALT 18      Anion Gap 8      eGFR >60     URINALYSIS, REFLEX TO URINE CULTURE - Abnormal    Color, UA Colorless (*)     Appearance, UA Clear      pH, UA 6.0      Spec Grav UA 1.010      Protein, UA Negative      Glucose, UA 4+ (*)     Ketones, UA Negative      Bilirubin, UA Negative      Blood, UA Negative      Nitrites, UA Negative      Urobilinogen, UA Negative      Leukocyte Esterase, UA Negative     ACETAMINOPHEN LEVEL - Abnormal    Acetaminophen Level <3.0 (*)    CBC WITH DIFFERENTIAL - Abnormal    WBC 5.12      RBC 4.42 (*)     HGB 13.1 (*)     HCT 40.7      MCV 92      MCH 29.6      MCHC 32.2      RDW 12.6      Platelet Count 177      MPV 11.4      Nucleated RBC 0      Neut % 60.7      Lymph % 29.1      Mono % 9.0      Eos % 0.4      Basophil % 0.6      Imm Grans % 0.2      Neut # 3.11      Lymph # 1.49      Mono # 0.46      Eos # 0.02      Baso # 0.03      Imm Grans # 0.01     POCT GLUCOSE MONITORING CONTINUOUS - Abnormal    POC Glucose 350 (*)    DRUG SCREEN PANEL, URINE EMERGENCY - Normal    Benzodiazepine, Urine Negative      Methadone, Urine Negative      Cocaine, Urine Negative      Opiates, Urine Negative      Barbiturates, Urine Negative      Amphetamines, Urine Negative      THC Negative      Phencyclidine, Urine Negative      Urine Creatinine 51.4      Narrative:     This screen includes the following classes of drugs at the listed cut-off:     Benzodiazepines:        200 ng/ml   Methadone:              300 ng/ml   Cocaine metabolite:     300 ng/ml   Opiates:                300 ng/ml   Barbiturates:            "200 ng/ml   Amphetamines:           1000 ng/ml   Marijuana metabs (THC): 50 ng/ml   Phencyclidine (PCP):    25 ng/ml     This is a screening test. If results do not correlate with clinical presentation, then a confirmatory send out test is advised.    This report is intended for use in clinical monitoring and management of patients. It is not intended for use in employment related drug testing."   ALCOHOL,MEDICAL (ETHANOL) - Normal    Alcohol, Serum <10     LIPASE - Normal    Lipase Level 18     CBC W/ AUTO DIFFERENTIAL    Narrative:     The following orders were created for panel order CBC auto differential.  Procedure                               Abnormality         Status                     ---------                               -----------         ------                     CBC with Differential[[id number]]        Abnormal            Final result                 Please view results for these tests on the individual orders.   GREY TOP URINE HOLD    Extra Tube Hold for add-ons.     EXTRA TUBES    Narrative:     The following orders were created for panel order EXTRA TUBES.  Procedure                               Abnormality         Status                     ---------                               -----------         ------                     Light Green Top Hold[[id number]]                            Final result               Light Green Top Hold[[id number]]                            Final result                 Please view results for these tests on the individual orders.   LIGHT GREEN TOP HOLD    Extra Tube Hold for add-ons.     LIGHT GREEN TOP HOLD    Extra Tube Hold for add-ons.     URINALYSIS MICROSCOPIC    Bacteria, UA None      Yeast, UA None      Microscopic Comment         EKG Readings: (Independently Interpreted)   Rhythm: Normal Sinus Rhythm. Heart Rate: 76. Ectopy: No Ectopy. Conduction: Normal. ST Segments: Normal ST Segments. T Waves: Normal. Clinical Impression: Normal Sinus Rhythm   QTc at " 414       Imaging Results              CT Head Without Contrast (Final result)  Result time 06/16/25 10:40:17      Final result by Mundo Love MD (06/16/25 10:40:17)                   Impression:      No acute intracranial findings.      Electronically signed by: Mundo Love  Date:    06/16/2025  Time:    10:40               Narrative:    EXAMINATION:  CT HEAD WITHOUT CONTRAST    CLINICAL HISTORY:  Mental status change, unknown cause;    TECHNIQUE:  Low dose axial images were obtained through the head.  Coronal and sagittal reformations were also performed. Contrast was not administered.    COMPARISON:  None.    FINDINGS:  Blood: No acute intracranial hemorrhage.    Parenchyma: No definite loss of gray-white differentiation to suggest acute or subacute transcortical infarct. Generalized pattern of age-related parenchymal volume loss.    Ventricles/Extra-axial spaces: No abnormal extra-axial fluid collection. Basal cisterns are patent.    Vessels: Minimal calcifications.    Orbits: Unremarkable.    Scalp: Unremarkable.    Skull: There are no depressed skull fractures or destructive bone lesions.    Sinuses and mastoids: Essentially clear.    Other findings: None                                       Medications   LORazepam injection 2 mg (0 mg Intramuscular Hold 6/16/25 0907)   haloperidol lactate injection 5 mg (0 mg Intramuscular Hold 6/16/25 0907)     Medical Decision Making  Differential diagnosis include but are not limited to: AAA, aortic dissection, mesenteric ischemia, perforated viscous, MI/ACS, SBO/volvulus, incarcerated/strangulated hernia, intussusception, ileus, appendicitis, cholecystitis, cholangitis, diverticulitis, esophagitis, hepatitis, nephrolithiasis, pancreatitis, gastroenteritis, colitis, IBD/IBS, biliary colic, GERD, PUD, constipation, UTI/pyelonephritis,  disorder.   Ddx includes acute psychotic episode, SI/danger to self, HI/danger to others, but also toxidrome, withdrawal (eg  from EtOH or BZD therapy), electrolyte derangement, serotonin syndrome, unusual pathology like anti-NMDA receptor encephalitis, other.  Will get basic screening psychiatric labs on the patient and reassess.  Will sedate patient if necessary.  Patient was placed on a PEC due to gravely disorder.  Multiple abdominal exam is benign do not think acute intra-abdominal pathology.  Workup reassuring other than elevated blood sugar but not DKA.  Will start on metformin.        Amount and/or Complexity of Data Reviewed  Labs: ordered. Decision-making details documented in ED Course.  Radiology: ordered and independent interpretation performed. Decision-making details documented in ED Course.  ECG/medicine tests: ordered and independent interpretation performed. Decision-making details documented in ED Course.    Risk  Prescription drug management.            Scribe Attestation:   Scribe #1: I performed the above scribed service and the documentation accurately describes the services I performed. I attest to the accuracy of the note.           Medically cleared for psychiatry placement: 6/16/2025 12:27 PM                   Clinical Impression:  Final diagnoses:  [F29] Psychosis, unspecified psychosis type (Primary)          ED Disposition Condition    Transfer to Psych Facility Stable          ED Prescriptions       Medication Sig Dispense Start Date End Date Auth. Provider    metFORMIN (GLUCOPHAGE) 500 MG tablet Take 1 tablet (500 mg total) by mouth daily with breakfast. 30 tablet 6/16/2025 6/16/2026 Monico Lemon MD          Follow-up Information    None         I, monico lemon, personally performed the services described in this documentation. All medical record entries made by the scribe were at my direction and in my presence. I have reviewed the chart and agree that the record reflects my personal performance and is accurate and complete.      DISCLAIMER: This note was prepared with MModal voice recognition  transcription software. Garbled syntax, mangled pronouns, and other bizarre constructions may be attributed to that software system.         [1]   Social History  Tobacco Use    Smoking status: Never    Smokeless tobacco: Never   Substance Use Topics    Alcohol use: No    Drug use: No        Monico Lemon MD  06/16/25 9920

## 2025-06-16 NOTE — ED NOTES
Security at the bedside. Patient permitted hospital phone use to update contacts on plan of care. Patient calm and cooperative.

## 2025-06-17 NOTE — ED NOTES
Returned personal belongings/valuables given to [facility] crew on [address], from bags [id number] and [id number]

## 2025-06-18 LAB
OHS QRS DURATION: 90 MS
OHS QTC CALCULATION: 414 MS